# Patient Record
Sex: MALE | Race: OTHER | HISPANIC OR LATINO | URBAN - METROPOLITAN AREA
[De-identification: names, ages, dates, MRNs, and addresses within clinical notes are randomized per-mention and may not be internally consistent; named-entity substitution may affect disease eponyms.]

---

## 2020-03-01 RX ADMIN — SODIUM CHLORIDE 1000 MILLILITER(S): 9 INJECTION INTRAMUSCULAR; INTRAVENOUS; SUBCUTANEOUS at 00:23

## 2020-03-31 ENCOUNTER — INPATIENT (INPATIENT)
Facility: HOSPITAL | Age: 45
LOS: 6 days | Discharge: HOME | DRG: 177 | End: 2020-04-07
Attending: FAMILY MEDICINE | Admitting: HOSPITALIST
Payer: MEDICAID

## 2020-03-31 VITALS
OXYGEN SATURATION: 94 % | RESPIRATION RATE: 24 BRPM | HEART RATE: 97 BPM | WEIGHT: 175.05 LBS | HEIGHT: 68 IN | TEMPERATURE: 100 F | SYSTOLIC BLOOD PRESSURE: 120 MMHG | DIASTOLIC BLOOD PRESSURE: 76 MMHG

## 2020-03-31 LAB
ALBUMIN SERPL ELPH-MCNC: 3.6 G/DL — SIGNIFICANT CHANGE UP (ref 3.3–5)
ALP SERPL-CCNC: 85 U/L — SIGNIFICANT CHANGE UP (ref 40–120)
ALT FLD-CCNC: 75 U/L — SIGNIFICANT CHANGE UP (ref 12–78)
ANION GAP SERPL CALC-SCNC: 6 MMOL/L — SIGNIFICANT CHANGE UP (ref 5–17)
AST SERPL-CCNC: 73 U/L — HIGH (ref 15–37)
BILIRUB SERPL-MCNC: 0.4 MG/DL — SIGNIFICANT CHANGE UP (ref 0.2–1.2)
BUN SERPL-MCNC: 8 MG/DL — SIGNIFICANT CHANGE UP (ref 7–23)
CALCIUM SERPL-MCNC: 8.2 MG/DL — LOW (ref 8.5–10.1)
CHLORIDE SERPL-SCNC: 107 MMOL/L — SIGNIFICANT CHANGE UP (ref 96–108)
CO2 SERPL-SCNC: 25 MMOL/L — SIGNIFICANT CHANGE UP (ref 22–31)
CREAT SERPL-MCNC: 1 MG/DL — SIGNIFICANT CHANGE UP (ref 0.5–1.3)
GLUCOSE SERPL-MCNC: 120 MG/DL — HIGH (ref 70–99)
LIDOCAIN IGE QN: 122 U/L — SIGNIFICANT CHANGE UP (ref 73–393)
POTASSIUM SERPL-MCNC: 3.8 MMOL/L — SIGNIFICANT CHANGE UP (ref 3.5–5.3)
POTASSIUM SERPL-SCNC: 3.8 MMOL/L — SIGNIFICANT CHANGE UP (ref 3.5–5.3)
PROT SERPL-MCNC: 8.5 G/DL — HIGH (ref 6–8.3)
SODIUM SERPL-SCNC: 138 MMOL/L — SIGNIFICANT CHANGE UP (ref 135–145)

## 2020-03-31 RX ORDER — ACETAMINOPHEN 500 MG
650 TABLET ORAL ONCE
Refills: 0 | Status: COMPLETED | OUTPATIENT
Start: 2020-03-31 | End: 2020-03-31

## 2020-03-31 RX ORDER — SODIUM CHLORIDE 9 MG/ML
3 INJECTION INTRAMUSCULAR; INTRAVENOUS; SUBCUTANEOUS ONCE
Refills: 0 | Status: COMPLETED | OUTPATIENT
Start: 2020-03-31 | End: 2020-03-31

## 2020-03-31 RX ORDER — SODIUM CHLORIDE 9 MG/ML
1000 INJECTION INTRAMUSCULAR; INTRAVENOUS; SUBCUTANEOUS ONCE
Refills: 0 | Status: COMPLETED | OUTPATIENT
Start: 2020-03-31 | End: 2020-03-31

## 2020-03-31 RX ADMIN — SODIUM CHLORIDE 1000 MILLILITER(S): 9 INJECTION INTRAMUSCULAR; INTRAVENOUS; SUBCUTANEOUS at 23:23

## 2020-03-31 RX ADMIN — SODIUM CHLORIDE 3 MILLILITER(S): 9 INJECTION INTRAMUSCULAR; INTRAVENOUS; SUBCUTANEOUS at 23:23

## 2020-03-31 RX ADMIN — Medication 650 MILLIGRAM(S): at 23:23

## 2020-03-31 NOTE — ED ADULT NURSE NOTE - OBJECTIVE STATEMENT
Pt presents to ED with c/o SOB with chest pain for 3 days. Pt states he felt like he had a fever since last Wednesday with cough. Pt denies having sputum. Abd soft, non-tender.

## 2020-03-31 NOTE — ED ADULT NURSE NOTE - ISOLATION INDICATION [employer] CONTACT
Called patient to schedule a screening mammogram PATIENTPHONEMESSAGE: left message.-     Additional information     Other Specify

## 2020-04-01 DIAGNOSIS — R09.02 HYPOXEMIA: ICD-10-CM

## 2020-04-01 DIAGNOSIS — Z29.9 ENCOUNTER FOR PROPHYLACTIC MEASURES, UNSPECIFIED: ICD-10-CM

## 2020-04-01 LAB
BASOPHILS # BLD AUTO: 0.01 K/UL — SIGNIFICANT CHANGE UP (ref 0–0.2)
BASOPHILS NFR BLD AUTO: 0.2 % — SIGNIFICANT CHANGE UP (ref 0–2)
EOSINOPHIL # BLD AUTO: 0.01 K/UL — SIGNIFICANT CHANGE UP (ref 0–0.5)
EOSINOPHIL NFR BLD AUTO: 0.2 % — SIGNIFICANT CHANGE UP (ref 0–6)
HCT VFR BLD CALC: 43.8 % — SIGNIFICANT CHANGE UP (ref 39–50)
HGB BLD-MCNC: 15.1 G/DL — SIGNIFICANT CHANGE UP (ref 13–17)
IMM GRANULOCYTES NFR BLD AUTO: 0.3 % — SIGNIFICANT CHANGE UP (ref 0–1.5)
LYMPHOCYTES # BLD AUTO: 1.04 K/UL — SIGNIFICANT CHANGE UP (ref 1–3.3)
LYMPHOCYTES # BLD AUTO: 16.4 % — SIGNIFICANT CHANGE UP (ref 13–44)
MCHC RBC-ENTMCNC: 29.7 PG — SIGNIFICANT CHANGE UP (ref 27–34)
MCHC RBC-ENTMCNC: 34.5 GM/DL — SIGNIFICANT CHANGE UP (ref 32–36)
MCV RBC AUTO: 86.1 FL — SIGNIFICANT CHANGE UP (ref 80–100)
MONOCYTES # BLD AUTO: 0.39 K/UL — SIGNIFICANT CHANGE UP (ref 0–0.9)
MONOCYTES NFR BLD AUTO: 6.2 % — SIGNIFICANT CHANGE UP (ref 2–14)
NEUTROPHILS # BLD AUTO: 4.87 K/UL — SIGNIFICANT CHANGE UP (ref 1.8–7.4)
NEUTROPHILS NFR BLD AUTO: 76.7 % — SIGNIFICANT CHANGE UP (ref 43–77)
NRBC # BLD: 0 /100 WBCS — SIGNIFICANT CHANGE UP (ref 0–0)
PLATELET # BLD AUTO: 126 K/UL — LOW (ref 150–400)
RBC # BLD: 5.09 M/UL — SIGNIFICANT CHANGE UP (ref 4.2–5.8)
RBC # FLD: 12.8 % — SIGNIFICANT CHANGE UP (ref 10.3–14.5)
SARS-COV-2 RNA SPEC QL NAA+PROBE: DETECTED
WBC # BLD: 6.34 K/UL — SIGNIFICANT CHANGE UP (ref 3.8–10.5)
WBC # FLD AUTO: 6.34 K/UL — SIGNIFICANT CHANGE UP (ref 3.8–10.5)

## 2020-04-01 PROCEDURE — 93010 ELECTROCARDIOGRAM REPORT: CPT

## 2020-04-01 PROCEDURE — 99285 EMERGENCY DEPT VISIT HI MDM: CPT

## 2020-04-01 PROCEDURE — 99223 1ST HOSP IP/OBS HIGH 75: CPT | Mod: GC

## 2020-04-01 PROCEDURE — 12345: CPT | Mod: NC

## 2020-04-01 PROCEDURE — 71045 X-RAY EXAM CHEST 1 VIEW: CPT | Mod: 26

## 2020-04-01 RX ORDER — ALBUTEROL 90 UG/1
2 AEROSOL, METERED ORAL EVERY 4 HOURS
Refills: 0 | Status: DISCONTINUED | OUTPATIENT
Start: 2020-04-01 | End: 2020-04-07

## 2020-04-01 RX ORDER — ACETAMINOPHEN 500 MG
650 TABLET ORAL EVERY 4 HOURS
Refills: 0 | Status: DISCONTINUED | OUTPATIENT
Start: 2020-04-01 | End: 2020-04-07

## 2020-04-01 RX ORDER — AZITHROMYCIN 500 MG/1
500 TABLET, FILM COATED ORAL ONCE
Refills: 0 | Status: COMPLETED | OUTPATIENT
Start: 2020-04-01 | End: 2020-04-01

## 2020-04-01 RX ORDER — ACETAMINOPHEN 500 MG
325 TABLET ORAL ONCE
Refills: 0 | Status: COMPLETED | OUTPATIENT
Start: 2020-04-01 | End: 2020-04-01

## 2020-04-01 RX ORDER — ENOXAPARIN SODIUM 100 MG/ML
40 INJECTION SUBCUTANEOUS DAILY
Refills: 0 | Status: DISCONTINUED | OUTPATIENT
Start: 2020-04-01 | End: 2020-04-07

## 2020-04-01 RX ADMIN — Medication 650 MILLIGRAM(S): at 12:24

## 2020-04-01 RX ADMIN — AZITHROMYCIN 500 MILLIGRAM(S): 500 TABLET, FILM COATED ORAL at 03:07

## 2020-04-01 RX ADMIN — Medication 650 MILLIGRAM(S): at 22:17

## 2020-04-01 RX ADMIN — AZITHROMYCIN 255 MILLIGRAM(S): 500 TABLET, FILM COATED ORAL at 02:01

## 2020-04-01 RX ADMIN — Medication 325 MILLIGRAM(S): at 00:43

## 2020-04-01 RX ADMIN — ENOXAPARIN SODIUM 40 MILLIGRAM(S): 100 INJECTION SUBCUTANEOUS at 18:42

## 2020-04-01 NOTE — ED PROVIDER NOTE - OBJECTIVE STATEMENT
Pt is a 45 yo male who presents to the ED with a cc of difficulty breathing and chest discomfort.  Pt reports that he has no known medical issues.  Reports that he has been feeling unwell since last Wednesday.  Pt reports that last Wednesday he developed subjective fevers, chills, fatigue, diffuse myalgias, and a non productive cough.  He reports that symptoms continued and appears to have worsened and then over the last 2-3 days he developed chest tightness most noted on inspiration with shortness or breath.  Denies N/V/D/C, abd pain, ext numbness or weakness.  Pt reports that he is not a smoker and has no underlying lung pathology.  Denies noting any sick contacts.  He has not been seen for these symptoms prior to today.

## 2020-04-01 NOTE — H&P ADULT - NSHPPHYSICALEXAM_GEN_ALL_CORE
Vital Signs Last 24 Hrs  T(C): 37.1 (01 Apr 2020 05:53), Max: 39.5 (31 Mar 2020 22:52)  T(F): 98.8 (01 Apr 2020 05:53), Max: 103.1 (31 Mar 2020 22:52)  HR: 88 (01 Apr 2020 05:53) (88 - 100)  BP: 123/79 (01 Apr 2020 05:53) (113/77 - 123/79)  BP(mean): --  RR: 20 (01 Apr 2020 05:53) (20 - 24)  SpO2: 97% (01 Apr 2020 05:53) (94% - 97%)    patient resting comfortable on ra , breathing regular non labored   please refer to ED provider note further exam details

## 2020-04-01 NOTE — PROGRESS NOTE ADULT - SUBJECTIVE AND OBJECTIVE BOX
*************CHARTING IN PROGRESS*****************  Patient is a 44y old  Male who presents with a chief complaint of r/o COVID, symptom onset 3/25/2020, FULL CODE (01 Apr 2020 02:18)      INTERVAL HPI/OVERNIGHT EVENTS: Patient seen and examined at bedside.    MEDICATIONS  (STANDING):  enoxaparin Injectable 40 milliGRAM(s) SubCutaneous daily    MEDICATIONS  (PRN):  acetaminophen   Tablet .. 650 milliGRAM(s) Oral every 4 hours PRN Temp greater or equal to 38.5C (101.3F)  ALBUTerol    90 MICROgram(s) HFA Inhaler 2 Puff(s) Inhalation every 4 hours PRN Shortness of Breath and/or Wheezing  benzonatate 100 milliGRAM(s) Oral three times a day PRN Cough      Allergies    No Known Allergies    Intolerances        REVIEW OF SYSTEMS:  CONSTITUTIONAL: No fever or chills  HEENT: No headache, no sore throat  RESPIRATORY: No cough, wheezing, or shortness of breath  CARDIOVASCULAR: No chest pain, palpitations, or leg swelling  GASTROINTESTINAL: No abd pain, nausea, vomiting, or diarrhea  GENITOURINARY: No dysuria, frequency, or hematuria  NEUROLOGICAL: No focal weakness or dizziness  MUSCULOSKELETAL: No myalgias     Vital Signs Last 24 Hrs  T(C): 37.2 (01 Apr 2020 08:03), Max: 39.5 (31 Mar 2020 22:52)  T(F): 98.9 (01 Apr 2020 08:03), Max: 103.1 (31 Mar 2020 22:52)  HR: 88 (01 Apr 2020 08:03) (88 - 100)  BP: 114/78 (01 Apr 2020 08:03) (113/77 - 123/79)  BP(mean): --  RR: 20 (01 Apr 2020 05:53) (20 - 24)  SpO2: 98% (01 Apr 2020 08:03) (94% - 98%)    PHYSICAL EXAM:  GENERAL: NAD  HEENT: EOMI, moist mucous membranes  CHEST/LUNG: CTA b/l, no rales, wheezes, or rhonchi  HEART: RRR, S1, S2  ABDOMEN: BS+, soft, nontender, nondistended  EXTREMITIES: No edema, cyanosis, or calf tenderness  NERVOUS SYSTEM: AA&Ox3    LABS:                        15.1   6.34  )-----------( 126      ( 31 Mar 2020 23:22 )             43.8     CBC Full  -  ( 31 Mar 2020 23:22 )  WBC Count : 6.34 K/uL  Hemoglobin : 15.1 g/dL  Hematocrit : 43.8 %  Platelet Count - Automated : 126 K/uL  Mean Cell Volume : 86.1 fl  Mean Cell Hemoglobin : 29.7 pg  Mean Cell Hemoglobin Concentration : 34.5 gm/dL  Auto Neutrophil # : 4.87 K/uL  Auto Lymphocyte # : 1.04 K/uL  Auto Monocyte # : 0.39 K/uL  Auto Eosinophil # : 0.01 K/uL  Auto Basophil # : 0.01 K/uL  Auto Neutrophil % : 76.7 %  Auto Lymphocyte % : 16.4 %  Auto Monocyte % : 6.2 %  Auto Eosinophil % : 0.2 %  Auto Basophil % : 0.2 %    31 Mar 2020 23:22    138    |  107    |  8      ----------------------------<  120    3.8     |  25     |  1.00     Ca    8.2        31 Mar 2020 23:22    TPro  8.5    /  Alb  3.6    /  TBili  0.4    /  DBili  x      /  AST  73     /  ALT  75     /  AlkPhos  85     31 Mar 2020 23:22        CAPILLARY BLOOD GLUCOSE              RADIOLOGY & ADDITIONAL TESTS:    Personally reviewed.     Consultant(s) Notes Reviewed:  [x] YES  [ ] NO Patient is a 44y old  Male who presents with a chief complaint of r/o COVID, symptom onset 3/25/2020, FULL CODE (01 Apr 2020 02:18)      INTERVAL HPI/OVERNIGHT EVENTS: Patient seen and examined at bedside. States he feels well, denies any CP, abd pain. Still with cough, breathing improved.    MEDICATIONS  (STANDING):  enoxaparin Injectable 40 milliGRAM(s) SubCutaneous daily    MEDICATIONS  (PRN):  acetaminophen   Tablet .. 650 milliGRAM(s) Oral every 4 hours PRN Temp greater or equal to 38.5C (101.3F)  ALBUTerol    90 MICROgram(s) HFA Inhaler 2 Puff(s) Inhalation every 4 hours PRN Shortness of Breath and/or Wheezing  benzonatate 100 milliGRAM(s) Oral three times a day PRN Cough      Allergies    No Known Allergies    Intolerances        REVIEW OF SYSTEMS:  CONSTITUTIONAL: No fever or chills  HEENT: No headache, no sore throat  RESPIRATORY: + cough, no wheezing, +SOB  CARDIOVASCULAR: No chest pain, palpitations, or leg swelling  GASTROINTESTINAL: No abd pain, nausea, vomiting, or diarrhea  GENITOURINARY: No dysuria, frequency, or hematuria  NEUROLOGICAL: No focal weakness or dizziness  MUSCULOSKELETAL: No myalgias     Vital Signs Last 24 Hrs  T(C): 37.2 (01 Apr 2020 08:03), Max: 39.5 (31 Mar 2020 22:52)  T(F): 98.9 (01 Apr 2020 08:03), Max: 103.1 (31 Mar 2020 22:52)  HR: 88 (01 Apr 2020 08:03) (88 - 100)  BP: 114/78 (01 Apr 2020 08:03) (113/77 - 123/79)  BP(mean): --  RR: 20 (01 Apr 2020 05:53) (20 - 24)  SpO2: 98% (01 Apr 2020 08:03) (94% - 98%)    PHYSICAL EXAM:  GENERAL: NAD  HEENT: EOMI, moist mucous membranes  CHEST/LUNG: CTA b/l, no rales, wheezes, or rhonchi  HEART: RRR, S1, S2  ABDOMEN: BS+, soft, nontender, nondistended  EXTREMITIES: No edema, cyanosis, or calf tenderness  NERVOUS SYSTEM: AA&Ox3    LABS:                        15.1   6.34  )-----------( 126      ( 31 Mar 2020 23:22 )             43.8     CBC Full  -  ( 31 Mar 2020 23:22 )  WBC Count : 6.34 K/uL  Hemoglobin : 15.1 g/dL  Hematocrit : 43.8 %  Platelet Count - Automated : 126 K/uL  Mean Cell Volume : 86.1 fl  Mean Cell Hemoglobin : 29.7 pg  Mean Cell Hemoglobin Concentration : 34.5 gm/dL  Auto Neutrophil # : 4.87 K/uL  Auto Lymphocyte # : 1.04 K/uL  Auto Monocyte # : 0.39 K/uL  Auto Eosinophil # : 0.01 K/uL  Auto Basophil # : 0.01 K/uL  Auto Neutrophil % : 76.7 %  Auto Lymphocyte % : 16.4 %  Auto Monocyte % : 6.2 %  Auto Eosinophil % : 0.2 %  Auto Basophil % : 0.2 %    31 Mar 2020 23:22    138    |  107    |  8      ----------------------------<  120    3.8     |  25     |  1.00     Ca    8.2        31 Mar 2020 23:22    TPro  8.5    /  Alb  3.6    /  TBili  0.4    /  DBili  x      /  AST  73     /  ALT  75     /  AlkPhos  85     31 Mar 2020 23:22        CAPILLARY BLOOD GLUCOSE              RADIOLOGY & ADDITIONAL TESTS:    Personally reviewed.     Consultant(s) Notes Reviewed:  [x] YES  [ ] NO

## 2020-04-01 NOTE — ED PROVIDER NOTE - CARE PLAN
Principal Discharge DX:	Hypoxia  Secondary Diagnosis:	Infiltrate noted on imaging study  Secondary Diagnosis:	Fever

## 2020-04-01 NOTE — PROGRESS NOTE ADULT - ASSESSMENT
43 yo male who presents to the ED with a cc of difficulty breathing and chest discomfort. Admitted for r/o COVID. none

## 2020-04-01 NOTE — PROGRESS NOTE ADULT - PROBLEM SELECTOR PLAN 1
Acute hypoxic respiratory failure 2/2 PNA in setting of suspected COVID19 viral illness given clinical presentation, CXR with bilateral GGO  - Day 8 of symptoms  - continue O2 NC, may use NRB  -  AVOID HFNC/Bipap/Nebs  - Contact and droplet isolation precautions  - Check CRP, D-dimer, ferritin, procalc  - Tylenol prn for fever  - may use albuterol/ipratroprium inhaler  - Start supplemental oxygen therapy immediately if pt has respiratory distress target SpO2 > 94%  - NEWS2 score 4 (if >= 7 high risk, consider ICU consult)  - monitor respiratory status closely  - patient is at a low risk of decompensation at this time  - Code Status: FULL CODE  -daily CBC w diff to follow eos, lymphocytes and neutrophils and daily NLR calculation (NLR <3 low vs >5 high) -other labs that may be selectively used to risk stratify and predict clinical course,   Procalcitonin (<0.2 associated with more severe disease),  Ferritin (lower risk <450 vs >850) CRP (low risk <2 and higher risk >6) D-dimer (<1,000 vs >1,000) LDH, ESR, PT/PTT, CK, lactate, troponin, IL-6  -antibiotics only if there is concern for a bacterial process

## 2020-04-01 NOTE — H&P ADULT - HISTORY OF PRESENT ILLNESS
45 yo male who presents to the ED with a cc of difficulty breathing and chest discomfort.  Pt reports that he has no known medical issues.  Reports that he has been feeling unwell since last Wednesday.  Pt reports that last Wednesday he developed subjective fevers, chills, fatigue, diffuse myalgias, and a non productive cough.  He reports that symptoms continued and appears to have worsened and then over the last 2-3 days he developed chest tightness most noted on inspiration with shortness or breath.  Denies N/V/D/C, abd pain, ext numbness or weakness.  Pt reports that he is not a smoker and has no underlying lung pathology.  Denies noting any sick contacts.  He has not been seen for these symptoms prior to today.    The date the pt first felt unwell:   Fever or chills: yes [  ]   no [  ]   - Tmax:   Fatigue, malaise or generalized weakness: yes [  ]   no [  ]  Shortness of breath/dyspnea: yes [  ]   no [  ]  Cough: yes [  ]   no [  ], sputum production: yes [  ]   no [  ]  Anorexia/po intolerance: yes [  ]   no [  ]  Chest pain or chest tightness: yes [  ]   no [  ]  Myalgias: yes [  ]   no [  ]  Headache: yes [  ]   no [  ]  Sore throat: yes [  ]   no [  ]  Rhinorrhea: yes [  ]   no [  ]  Abd pain: yes [  ]   no [  ]  Nausea: yes [  ]   no [  ]  Vomiting: yes [  ]   no [  ]  Diarrhea: yes [  ]   no [  ]  Loss of sense of smell/anosmia: yes [  ]   no [  ]  Conjunctivitis: yes [  ]   no [  ]  Recent travel: yes [  ]   no [  ] - Location:   Any sick contacts: yes [  ]   no [  ]  Close contact with someone confirmed positive with COVID-19 / SARS-CoV2 in the last 14 days: yes [  ]   no [  ]  Other associated complaints:   Code status: 45 yo male who presents to the ED with a cc of difficulty breathing and chest discomfort.  Pt reports that he has no known medical issues.  Reports that he has been feeling unwell since last Wednesday.  Pt reports that last Wednesday he developed subjective fevers, chills, fatigue, diffuse myalgias, and a non productive cough.  He reports that symptoms continued and appears to have worsened and then over the last 2-3 days he developed chest tightness most noted on inspiration with shortness or breath.  Denies N/V/D/C, abd pain, ext numbness or weakness.  Pt reports that he is not a smoker and has no underlying lung pathology.  Denies noting any sick contacts.  He has not been seen for these symptoms prior to today.    The date the pt first felt unwell:   Fever or chills: yes [x  ]   no [  ]   - Tmax:   Fatigue, malaise or generalized weakness: yes [ x ]   no [  ]  Shortness of breath/dyspnea: yes [ x ]   no [  ]  Cough: yes [ x ]   no [  ], sputum production: yes [  ]   no [x  ]  Anorexia/po intolerance: yes [  ]   no [ x ]  Chest pain or chest tightness: yes [ x ]   no [  ]  Myalgias: yes [  ]   no [ x ]  Headache: yes [  ]   no [ x ]  Sore throat: yes [  ]   no [x  ]  Rhinorrhea: yes [  ]   no [ x ]  Abd pain: yes [  ]   no [ x ]  Nausea: yes [  ]   no [ x ]  Vomiting: yes [  ]   no [ x ]  Diarrhea: yes [  ]   no [x  ]  Loss of sense of smell/anosmia: yes [  ]   no [ x ]  Conjunctivitis: yes [  ]   no [ x ]  Recent travel: yes [  ]   no [ x ] - Location:   Any sick contacts: yes [  ]   no [ x ]  Close contact with someone confirmed positive with COVID-19 / SARS-CoV2 in the last 14 days: yes [  ]   no [x  ]  Other associated complaints:   Code status: FULL CODE

## 2020-04-01 NOTE — H&P ADULT - NSHPREVIEWOFSYSTEMS_GEN_ALL_CORE
HEMATOLOGIC: denies blood in sputum, urine, stool  LYMPHATICS: denies extremity swelling, denies enlarged or tender lymph nodes  SKIN: denies new lesions, rash  CARDIOVASCULAR: as per HPI  CONSTITUTIONAL: as per HPI  HEENT: as per HPI  RESPIRATORY: as per HPI  GASTROINTESTINAL: as per HPI  NEUROLOGICAL: as per HPI  MUSCULOSKELETAL: as per HPI

## 2020-04-01 NOTE — ED PROVIDER NOTE - CLINICAL SUMMARY MEDICAL DECISION MAKING FREE TEXT BOX
Pt is a 43 yo male who presents to the ED with a cc of difficulty breathing and chest discomfort.  Pt reports that he has no known medical issues.  Reports that he has been feeling unwell since last Wednesday.  Pt reports that last Wednesday he developed subjective fevers, chills, fatigue, diffuse myalgias, and a non productive cough.  He reports that symptoms continued and appears to have worsened and then over the last 2-3 days he developed chest tightness most noted on inspiration with shortness or breath.  Denies N/V/D/C, abd pain, ext numbness or weakness.  Pt reports that he is not a smoker and has no underlying lung pathology.  Denies noting any sick contacts.  He has not been seen for these symptoms prior to today.  Pt with symptoms concerning for COVID now with worsening respiratory status and high spiking fevers.  Will obtain screening labs, swab for COVID and obtain chest x-ray.  Will hydrate, control fever and monitor

## 2020-04-01 NOTE — ED ADULT NURSE REASSESSMENT NOTE - NS ED NURSE REASSESS COMMENT FT1
Pt temp reassessed. 100.5. Pt ambulated on RA dropped to 90%. Pt placed on 2 L nasal cannula O2 sat 95% now. Pt denies SOB, chest pain when ambulating.
Patient is alert and calm.  He is sitting on stretcher in room 17.  Respirations are even and unlabored, skin is warm and dry.  He denies needs.  Call bell is within reach.
Pt fever is 102.2. MD Amos aware tylenol ordered and given. Ice packs placed behind head and armpits. will reassess.

## 2020-04-01 NOTE — H&P ADULT - PROBLEM SELECTOR PLAN 2
lovenox 40 daily   IMPROVE VTE Individual Risk Assessment          RISK                                                          Points    [  ] Previous VTE                                                3  [  ] Thrombophilia                                             2  [  ] Lower limb paralysis                                   2        (unable to hold up >15 seconds)    [  ] Current Cancer                                            2         (within 6 months)  [  ] Immobilization > 24 hrs                              1  [  ] ICU/CCU stay > 24 hours                            1  [  ] Age > 60                                                    1    IMPROVE VTE Score ____0_____

## 2020-04-01 NOTE — H&P ADULT - PROBLEM SELECTOR PLAN 1
Acute hypoxic respiratory failure 2/2 PNA in setting of suspected COVID19 viral illness given clinical presentation, CXR with bilateral GGO  - Day 8 of symptoms  - continue O2 NC, may use NRB  -  AVOID HFNC/Bipap/Nebs  - Contact and droplet isolation precautions  - Check CRP, D-dimer, ferritin, procalc  - Tylenol prn for fever  - may use albuterol/ipratroprium inhaler  - Start supplemental oxygen therapy immediately if pt has respiratory distress target SpO2 > 94%  - NEWS2 score 4 (if >= 7 high risk, consider ICU consult)  - monitor respiratory status closely  - patient is at a low risk of decompensation at this time  - Code Status:   -daily CBC w diff to follow eos, lymphocytes and neutrophils and daily NLR calculation (NLR <3 low vs >5 high) -other labs that may be selectively used to risk stratify and predict clinical course,   Procalcitonin (<0.2 associated with more severe disease),  Ferritin (lower risk <450 vs >850) CRP (low risk <2 and higher risk >6) D-dimer (<1,000 vs >1,000) LDH, ESR, PT/PTT, CK, lactate, troponin, IL-6  -antibiotics only if there is concern for a bacterial process Acute hypoxic respiratory failure 2/2 PNA in setting of suspected COVID19 viral illness given clinical presentation, CXR with bilateral GGO  - Day 8 of symptoms  - continue O2 NC, may use NRB  -  AVOID HFNC/Bipap/Nebs  - Contact and droplet isolation precautions  - Check CRP, D-dimer, ferritin, procalc  - Tylenol prn for fever  - may use albuterol/ipratroprium inhaler  - Start supplemental oxygen therapy immediately if pt has respiratory distress target SpO2 > 94%  - NEWS2 score 4 (if >= 7 high risk, consider ICU consult)  - monitor respiratory status closely  - patient is at a low risk of decompensation at this time  - Code Status: FULL CODE  -daily CBC w diff to follow eos, lymphocytes and neutrophils and daily NLR calculation (NLR <3 low vs >5 high) -other labs that may be selectively used to risk stratify and predict clinical course,   Procalcitonin (<0.2 associated with more severe disease),  Ferritin (lower risk <450 vs >850) CRP (low risk <2 and higher risk >6) D-dimer (<1,000 vs >1,000) LDH, ESR, PT/PTT, CK, lactate, troponin, IL-6  -antibiotics only if there is concern for a bacterial process

## 2020-04-01 NOTE — H&P ADULT - ATTENDING COMMENTS
44M p/w fever cough, myalgias and SOB x 1 week , febrile t max 103.1 , hypoxic w/ ambulation , improves w/ NC , CXR w/ b/l opacities , ekg NSR w/ qtc 421, can continue supportive care and f/o covid pcr

## 2020-04-02 DIAGNOSIS — J12.81 PNEUMONIA DUE TO SARS-ASSOCIATED CORONAVIRUS: ICD-10-CM

## 2020-04-02 LAB
ALBUMIN SERPL ELPH-MCNC: 3.1 G/DL — LOW (ref 3.3–5)
ALP SERPL-CCNC: 94 U/L — SIGNIFICANT CHANGE UP (ref 40–120)
ALT FLD-CCNC: 61 U/L — SIGNIFICANT CHANGE UP (ref 12–78)
ANION GAP SERPL CALC-SCNC: 10 MMOL/L — SIGNIFICANT CHANGE UP (ref 5–17)
AST SERPL-CCNC: 68 U/L — HIGH (ref 15–37)
BASOPHILS # BLD AUTO: 0 K/UL — SIGNIFICANT CHANGE UP (ref 0–0.2)
BASOPHILS NFR BLD AUTO: 0 % — SIGNIFICANT CHANGE UP (ref 0–2)
BILIRUB SERPL-MCNC: 0.7 MG/DL — SIGNIFICANT CHANGE UP (ref 0.2–1.2)
BUN SERPL-MCNC: 10 MG/DL — SIGNIFICANT CHANGE UP (ref 7–23)
CALCIUM SERPL-MCNC: 8.7 MG/DL — SIGNIFICANT CHANGE UP (ref 8.5–10.1)
CHLORIDE SERPL-SCNC: 104 MMOL/L — SIGNIFICANT CHANGE UP (ref 96–108)
CO2 SERPL-SCNC: 23 MMOL/L — SIGNIFICANT CHANGE UP (ref 22–31)
CREAT SERPL-MCNC: 0.99 MG/DL — SIGNIFICANT CHANGE UP (ref 0.5–1.3)
CRP SERPL-MCNC: 11.5 MG/DL — HIGH (ref 0–0.4)
D DIMER BLD IA.RAPID-MCNC: 229 NG/ML DDU — SIGNIFICANT CHANGE UP
EOSINOPHIL # BLD AUTO: 0 K/UL — SIGNIFICANT CHANGE UP (ref 0–0.5)
EOSINOPHIL NFR BLD AUTO: 0 % — SIGNIFICANT CHANGE UP (ref 0–6)
ERYTHROCYTE [SEDIMENTATION RATE] IN BLOOD: 53 MM/HR — HIGH (ref 0–15)
FERRITIN SERPL-MCNC: 1244 NG/ML — HIGH (ref 30–400)
GLUCOSE SERPL-MCNC: 108 MG/DL — HIGH (ref 70–99)
HCT VFR BLD CALC: 43.1 % — SIGNIFICANT CHANGE UP (ref 39–50)
HGB BLD-MCNC: 14.7 G/DL — SIGNIFICANT CHANGE UP (ref 13–17)
LDH SERPL L TO P-CCNC: 477 U/L — HIGH (ref 50–242)
LYMPHOCYTES # BLD AUTO: 2.09 K/UL — SIGNIFICANT CHANGE UP (ref 1–3.3)
LYMPHOCYTES # BLD AUTO: 22 % — SIGNIFICANT CHANGE UP (ref 13–44)
MCHC RBC-ENTMCNC: 29.4 PG — SIGNIFICANT CHANGE UP (ref 27–34)
MCHC RBC-ENTMCNC: 34.1 GM/DL — SIGNIFICANT CHANGE UP (ref 32–36)
MCV RBC AUTO: 86.2 FL — SIGNIFICANT CHANGE UP (ref 80–100)
MONOCYTES # BLD AUTO: 0.19 K/UL — SIGNIFICANT CHANGE UP (ref 0–0.9)
MONOCYTES NFR BLD AUTO: 2 % — SIGNIFICANT CHANGE UP (ref 2–14)
NEUTROPHILS # BLD AUTO: 7.04 K/UL — SIGNIFICANT CHANGE UP (ref 1.8–7.4)
NEUTROPHILS NFR BLD AUTO: 56 % — SIGNIFICANT CHANGE UP (ref 43–77)
NRBC # BLD: SIGNIFICANT CHANGE UP /100 WBCS (ref 0–0)
PLATELET # BLD AUTO: 179 K/UL — SIGNIFICANT CHANGE UP (ref 150–400)
POTASSIUM SERPL-MCNC: 4.1 MMOL/L — SIGNIFICANT CHANGE UP (ref 3.5–5.3)
POTASSIUM SERPL-SCNC: 4.1 MMOL/L — SIGNIFICANT CHANGE UP (ref 3.5–5.3)
PROCALCITONIN SERPL-MCNC: 0.41 NG/ML — HIGH (ref 0–0.04)
PROT SERPL-MCNC: 8.1 G/DL — SIGNIFICANT CHANGE UP (ref 6–8.3)
RBC # BLD: 5 M/UL — SIGNIFICANT CHANGE UP (ref 4.2–5.8)
RBC # FLD: 13 % — SIGNIFICANT CHANGE UP (ref 10.3–14.5)
SODIUM SERPL-SCNC: 137 MMOL/L — SIGNIFICANT CHANGE UP (ref 135–145)
WBC # BLD: 9.51 K/UL — SIGNIFICANT CHANGE UP (ref 3.8–10.5)
WBC # FLD AUTO: 9.51 K/UL — SIGNIFICANT CHANGE UP (ref 3.8–10.5)

## 2020-04-02 PROCEDURE — 99233 SBSQ HOSP IP/OBS HIGH 50: CPT

## 2020-04-02 RX ADMIN — ENOXAPARIN SODIUM 40 MILLIGRAM(S): 100 INJECTION SUBCUTANEOUS at 18:06

## 2020-04-02 RX ADMIN — Medication 650 MILLIGRAM(S): at 18:13

## 2020-04-02 RX ADMIN — Medication 650 MILLIGRAM(S): at 10:28

## 2020-04-02 RX ADMIN — ALBUTEROL 2 PUFF(S): 90 AEROSOL, METERED ORAL at 18:12

## 2020-04-02 NOTE — CONSULT NOTE ADULT - ASSESSMENT
43 yo maleadm with a cc of difficulty breathing and chest discomfort and is COVID+  COVID-19---high risk period for decompensation  (7-14 days post symptom onset), so critical to determine date of symptom onset,  avoid aerosolizing procedures,  focus on supportive care, avoid excessive blood draws so limit lab testing and draws - do recommend for hospitalized patients  -For all patients: daily BMP and CBC w diff to follow eos, lymphocytes and neutrophils and daily   NLR baseline and dynamic calculation (NLR <3 low vs >5 high) -other labs at admission to risk stratify and predict clinical course,  Procalcitonin (>0.2 associated with more severe disease),  Ferritin (lower risk <450 vs >850),  CRP (low risk <2 and higher risk >6) , D-dimer (<1,000 vs >1,000)   and if prognosis is unclear or if immunomodulators being considered: LDH, ESR, PT/PTT, CK, total, CKMB mass assay, lactate, troponin, IL-6 (and other interleukins as indicated)  -antibiotics only if there is concern for a bacterial process, HCQ 400mg PO BID day 1 followed by 200mg PO BID x 4 days may play a role if started early in disease, noted that methylprednisolone (SOLU-medrol) 1mg/kg/day IV x 5 days at onset of increased oxygen requirement (potential to finish w PO) and IL6 inhibition during this critical window may be associated with avoiding intubation and improved outcomes    3/25 symptom onset  4/2 NLR 7.04/2.09 =3.4 an on NC, follow for any increasing oxygen need

## 2020-04-02 NOTE — PROGRESS NOTE ADULT - PROBLEM SELECTOR PLAN 1
Acute hypoxic respiratory failure 2/2 PNA in setting of suspected COVID19 viral illness given clinical presentation, CXR with bilateral GGO  - Day 9 of symptoms  - continue O2 NC, may use NRB  -  AVOID HFNC/Bipap/Nebs  - Contact and droplet isolation precautions  - Check CRP, D-dimer, ferritin, procalc  - Tylenol prn for fever  - may use albuterol/ipratroprium inhaler  - Start supplemental oxygen therapy immediately if pt has respiratory distress target SpO2 > 94%  - NEWS2 score 4 (if >= 7 high risk, consider ICU consult)  - monitor respiratory status closely  - patient is at a low risk of decompensation at this time  - Code Status: FULL CODE  -daily CBC w diff to follow eos, lymphocytes and neutrophils and daily NLR calculation (NLR <3 low vs >5 high) -other labs that may be selectively used to risk stratify and predict clinical course,   Procalcitonin (<0.2 associated with more severe disease),  Ferritin (lower risk <450 vs >850) CRP (low risk <2 and higher risk >6) D-dimer (<1,000 vs >1,000) LDH, ESR, PT/PTT, CK, lactate, troponin, IL-6  -antibiotics only if there is concern for a bacterial process  -COVID positive

## 2020-04-02 NOTE — PROGRESS NOTE ADULT - ASSESSMENT
43 yo male who presents to the ED with a cc of difficulty breathing and chest discomfort. Admitted for r/o COVID.

## 2020-04-02 NOTE — CONSULT NOTE ADULT - SUBJECTIVE AND OBJECTIVE BOX
HPI:  45 yo male who presents to the ED 4/1 with a cc of difficulty breathing and chest discomfort.  Pt reports that he has no known medical issues.  Reports that he has been feeling unwell since last Wednesday.  Pt reports that last Wednesday he developed subjective fevers, chills, fatigue, diffuse myalgias, and a non productive cough.  He reports that symptoms continued and appears to have worsened and then over the last 2-3 days he developed chest tightness most noted on inspiration with shortness or breath.  Denies N/V/D/C, abd pain, ext numbness or weakness.  Pt reports that he is not a smoker and has no underlying lung pathology.  Denies noting any sick contacts.  He has not been seen for these symptoms prior to today.    3/25 symptom onset    The date the pt first felt unwell:   Fever or chills: yes [x  ]   no [  ]   - Tmax:   Fatigue, malaise or generalized weakness: yes [ x ]   no [  ]  Shortness of breath/dyspnea: yes [ x ]   no [  ]  Cough: yes [ x ]   no [  ], sputum production: yes [  ]   no [x  ]  Anorexia/po intolerance: yes [  ]   no [ x ]  Chest pain or chest tightness: yes [ x ]   no [  ]  Myalgias: yes [  ]   no [ x ]  Headache: yes [  ]   no [ x ]  Sore throat: yes [  ]   no [x  ]  Rhinorrhea: yes [  ]   no [ x ]  Abd pain: yes [  ]   no [ x ]  Nausea: yes [  ]   no [ x ]  Vomiting: yes [  ]   no [ x ]  Diarrhea: yes [  ]   no [x  ]  Loss of sense of smell/anosmia: yes [  ]   no [ x ]  Conjunctivitis: yes [  ]   no [ x ]  Recent travel: yes [  ]   no [ x ] - Location:   Any sick contacts: yes [  ]   no [ x ]  Close contact with someone confirmed positive with COVID-19 / SARS-CoV2 in the last 14 days: yes [  ]   no [x  ]  Other associated complaints:   Code status: FULL CODE (01 Apr 2020 02:18)      PAST MEDICAL & SURGICAL HISTORY:  No pertinent past medical history  No significant past surgical history      Antimicrobials      Immunological      Other  acetaminophen   Tablet .. 650 milliGRAM(s) Oral every 4 hours PRN  ALBUTerol    90 MICROgram(s) HFA Inhaler 2 Puff(s) Inhalation every 4 hours PRN  benzonatate 100 milliGRAM(s) Oral three times a day PRN  enoxaparin Injectable 40 milliGRAM(s) SubCutaneous daily      Allergies    No Known Allergies    Intolerances        SOCIAL HISTORY:  Social History:  live with son  independent adls  non smoker  denies etoh   denies drug use  FULL CODE (01 Apr 2020 02:18)      FAMILY HISTORY:      ROS:    limited    Vital Signs Last 24 Hrs  T(C): 37.7 (02 Apr 2020 14:44), Max: 39.4 (01 Apr 2020 20:40)  T(F): 99.8 (02 Apr 2020 14:44), Max: 103 (01 Apr 2020 20:40)  HR: 77 (02 Apr 2020 14:44) (77 - 139)  BP: 108/71 (02 Apr 2020 14:44) (98/69 - 110/63)  BP(mean): --  RR: 18 (02 Apr 2020 14:44) (18 - 19)  SpO2: 93% (02 Apr 2020 14:44) (90% - 96%)    PE:  on NC  HEENT:  NC, atraumatic  Lungs:  No accessory muscle use, breathing comfortably  Cor:  distant  Abd:  Symmetric, appears normal,   Extrem:  No cyanosis      LABS:                        14.7   9.51  )-----------( 179      ( 02 Apr 2020 10:00 )             43.1       WBC Count: 9.51 K/uL (04-02-20 @ 10:00)  WBC Count: 6.34 K/uL (03-31-20 @ 23:22)      04-02    137  |  104  |  10  ----------------------------<  108<H>  4.1   |  23  |  0.99    Ca    8.7      02 Apr 2020 10:00    TPro  8.1  /  Alb  3.1<L>  /  TBili  0.7  /  DBili  x   /  AST  68<H>  /  ALT  61  /  AlkPhos  94  04-02      Creatinine, Serum: 0.99 mg/dL (04-02-20 @ 10:00)  Creatinine, Serum: 1.00 mg/dL (03-31-20 @ 23:22)              COVID RISK SCORE:  Auto Neutrophil #: 7.04 K/uL (04-02-20 @ 10:00)  Auto Lymphocyte #: 2.09 K/uL (04-02-20 @ 10:00)  Auto Lymphocyte #: 1.04 K/uL (03-31-20 @ 23:22)  Auto Neutrophil #: 4.87 K/uL (03-31-20 @ 23:22)      Auto Eosinophil #: 0.00 K/uL (04-02-20 @ 10:00)  Auto Eosinophil #: 0.01 K/uL (03-31-20 @ 23:22)    Lactate Dehydrogenase, Serum: 477 U/L (04-02-20 @ 15:19)    Sedimentation Rate, Erythrocyte: 53 mm/hr (04-02-20 @ 10:00)    Procalcitonin, Serum: 0.41 ng/mL (04-02-20 @ 10:00)            Ferritin, Serum: 1244 ng/mL (04-02-20 @ 15:40)          D-Dimer Assay, Quantitative: 229 ng/mL DDU (04-02-20 @ 10:00)        MICROBIOLOGY:      RADIOLOGY & ADDITIONAL STUDIES:    --

## 2020-04-02 NOTE — PROGRESS NOTE ADULT - SUBJECTIVE AND OBJECTIVE BOX
INTERVAL HPI/OVERNIGHT EVENTS: Patient seen and examined at bedside. States he feels well, denies any CP, SOB, abd pain. Febrile overnight and this AM.    MEDICATIONS  (STANDING):  enoxaparin Injectable 40 milliGRAM(s) SubCutaneous daily    MEDICATIONS  (PRN):  acetaminophen   Tablet .. 650 milliGRAM(s) Oral every 4 hours PRN Temp greater or equal to 38.5C (101.3F)  ALBUTerol    90 MICROgram(s) HFA Inhaler 2 Puff(s) Inhalation every 4 hours PRN Shortness of Breath and/or Wheezing  benzonatate 100 milliGRAM(s) Oral three times a day PRN Cough      Allergies    No Known Allergies    Intolerances      REVIEW OF SYSTEMS:  CONSTITUTIONAL: No fever or chills  HEENT: No headache, no sore throat  RESPIRATORY: + cough, no wheezing, no SOB  CARDIOVASCULAR: No chest pain, palpitations, or leg swelling  GASTROINTESTINAL: No abd pain, nausea, vomiting, or diarrhea  GENITOURINARY: No dysuria, frequency, or hematuria  NEUROLOGICAL: No focal weakness or dizziness  MUSCULOSKELETAL: No myalgias     Vital Signs Last 24 Hrs  T(C): 37.7 (02 Apr 2020 14:44), Max: 38.2 (02 Apr 2020 04:25)  T(F): 99.8 (02 Apr 2020 14:44), Max: 100.7 (02 Apr 2020 04:25)  HR: 77 (02 Apr 2020 14:44) (77 - 139)  BP: 108/71 (02 Apr 2020 14:44) (98/69 - 108/71)  BP(mean): --  RR: 18 (02 Apr 2020 14:44) (18 - 18)  SpO2: 93% (02 Apr 2020 14:44) (90% - 96%)      PHYSICAL EXAM:  GENERAL: NAD  HEENT: EOMI, moist mucous membranes  CHEST/LUNG: CTA b/l, no rales, wheezes, or rhonchi  HEART: RRR, S1, S2  ABDOMEN: BS+, soft, nontender, nondistended  EXTREMITIES: No edema, cyanosis, or calf tenderness  NERVOUS SYSTEM: AA&Ox3      LABS:                        14.7   9.51  )-----------( 179      ( 02 Apr 2020 10:00 )             43.1     04-02    137  |  104  |  10  ----------------------------<  108<H>  4.1   |  23  |  0.99    Ca    8.7      02 Apr 2020 10:00    TPro  8.1  /  Alb  3.1<L>  /  TBili  0.7  /  DBili  x   /  AST  68<H>  /  ALT  61  /  AlkPhos  94  04-02          RADIOLOGY & ADDITIONAL TESTS:

## 2020-04-03 LAB
ALBUMIN SERPL ELPH-MCNC: 2.8 G/DL — LOW (ref 3.3–5)
ALP SERPL-CCNC: 93 U/L — SIGNIFICANT CHANGE UP (ref 40–120)
ALT FLD-CCNC: 66 U/L — SIGNIFICANT CHANGE UP (ref 12–78)
ANION GAP SERPL CALC-SCNC: 9 MMOL/L — SIGNIFICANT CHANGE UP (ref 5–17)
AST SERPL-CCNC: 75 U/L — HIGH (ref 15–37)
BASOPHILS # BLD AUTO: 0.01 K/UL — SIGNIFICANT CHANGE UP (ref 0–0.2)
BASOPHILS NFR BLD AUTO: 0.2 % — SIGNIFICANT CHANGE UP (ref 0–2)
BILIRUB SERPL-MCNC: 0.6 MG/DL — SIGNIFICANT CHANGE UP (ref 0.2–1.2)
BUN SERPL-MCNC: 12 MG/DL — SIGNIFICANT CHANGE UP (ref 7–23)
CALCIUM SERPL-MCNC: 8.6 MG/DL — SIGNIFICANT CHANGE UP (ref 8.5–10.1)
CHLORIDE SERPL-SCNC: 105 MMOL/L — SIGNIFICANT CHANGE UP (ref 96–108)
CO2 SERPL-SCNC: 24 MMOL/L — SIGNIFICANT CHANGE UP (ref 22–31)
CREAT SERPL-MCNC: 0.85 MG/DL — SIGNIFICANT CHANGE UP (ref 0.5–1.3)
EOSINOPHIL # BLD AUTO: 0 K/UL — SIGNIFICANT CHANGE UP (ref 0–0.5)
EOSINOPHIL NFR BLD AUTO: 0 % — SIGNIFICANT CHANGE UP (ref 0–6)
GLUCOSE SERPL-MCNC: 127 MG/DL — HIGH (ref 70–99)
HCT VFR BLD CALC: 41.7 % — SIGNIFICANT CHANGE UP (ref 39–50)
HGB BLD-MCNC: 14.1 G/DL — SIGNIFICANT CHANGE UP (ref 13–17)
IMM GRANULOCYTES NFR BLD AUTO: 0.3 % — SIGNIFICANT CHANGE UP (ref 0–1.5)
LYMPHOCYTES # BLD AUTO: 1.28 K/UL — SIGNIFICANT CHANGE UP (ref 1–3.3)
LYMPHOCYTES # BLD AUTO: 19.2 % — SIGNIFICANT CHANGE UP (ref 13–44)
MCHC RBC-ENTMCNC: 28.8 PG — SIGNIFICANT CHANGE UP (ref 27–34)
MCHC RBC-ENTMCNC: 33.8 GM/DL — SIGNIFICANT CHANGE UP (ref 32–36)
MCV RBC AUTO: 85.3 FL — SIGNIFICANT CHANGE UP (ref 80–100)
MONOCYTES # BLD AUTO: 0.43 K/UL — SIGNIFICANT CHANGE UP (ref 0–0.9)
MONOCYTES NFR BLD AUTO: 6.5 % — SIGNIFICANT CHANGE UP (ref 2–14)
NEUTROPHILS # BLD AUTO: 4.91 K/UL — SIGNIFICANT CHANGE UP (ref 1.8–7.4)
NEUTROPHILS NFR BLD AUTO: 73.8 % — SIGNIFICANT CHANGE UP (ref 43–77)
NRBC # BLD: 0 /100 WBCS — SIGNIFICANT CHANGE UP (ref 0–0)
PLATELET # BLD AUTO: 206 K/UL — SIGNIFICANT CHANGE UP (ref 150–400)
POTASSIUM SERPL-MCNC: 3.6 MMOL/L — SIGNIFICANT CHANGE UP (ref 3.5–5.3)
POTASSIUM SERPL-SCNC: 3.6 MMOL/L — SIGNIFICANT CHANGE UP (ref 3.5–5.3)
PROT SERPL-MCNC: 8.2 G/DL — SIGNIFICANT CHANGE UP (ref 6–8.3)
RBC # BLD: 4.89 M/UL — SIGNIFICANT CHANGE UP (ref 4.2–5.8)
RBC # FLD: 13.2 % — SIGNIFICANT CHANGE UP (ref 10.3–14.5)
SODIUM SERPL-SCNC: 138 MMOL/L — SIGNIFICANT CHANGE UP (ref 135–145)
WBC # BLD: 6.65 K/UL — SIGNIFICANT CHANGE UP (ref 3.8–10.5)
WBC # FLD AUTO: 6.65 K/UL — SIGNIFICANT CHANGE UP (ref 3.8–10.5)

## 2020-04-03 PROCEDURE — 99232 SBSQ HOSP IP/OBS MODERATE 35: CPT

## 2020-04-03 RX ADMIN — ENOXAPARIN SODIUM 40 MILLIGRAM(S): 100 INJECTION SUBCUTANEOUS at 11:55

## 2020-04-03 NOTE — PROGRESS NOTE ADULT - ASSESSMENT
45 yo maleadm with a cc of difficulty breathing and chest discomfort and is COVID+  COVID-19---high risk period for decompensation  (7-14 days post symptom onset), so critical to determine date of symptom onset,  avoid aerosolizing procedures,  focus on supportive care, avoid excessive blood draws so limit lab testing and draws - do recommend for hospitalized patients  -For all patients: daily BMP and CBC w diff to follow eos, lymphocytes and neutrophils and daily   NLR baseline and dynamic calculation (NLR <3 low vs >5 high) -other labs at admission to risk stratify and predict clinical course,  Procalcitonin (>0.2 associated with more severe disease),  Ferritin (lower risk <450 vs >850),  CRP (low risk <2 and higher risk >6) , D-dimer (<1,000 vs >1,000)   and if prognosis is unclear or if immunomodulators being considered: LDH, ESR, PT/PTT, CK, total, CKMB mass assay, lactate, troponin, IL-6 (and other interleukins as indicated)  -antibiotics only if there is concern for a bacterial process, HCQ 400mg PO BID day 1 followed by 200mg PO BID x 4 days may play a role if started early in disease, noted that methylprednisolone (SOLU-medrol) 1mg/kg/day IV x 5 days at onset of increased oxygen requirement (potential to finish w PO) and IL6 inhibition during this critical window may be associated with avoiding intubation and improved outcomes    3/25 symptom onset  4/2 NLR 7.04/2.09 =3.4 an on NC  4/3 NLR 3.83

## 2020-04-03 NOTE — PROGRESS NOTE ADULT - SUBJECTIVE AND OBJECTIVE BOX
[facility], Division of Infectious Diseases  AUNDREA Fox A. Lee  [phone]    Name: ILANA BAEZA  Age: 44y  Gender: Male  MRN: [id number]    Interval History--  Notes reviewed  pt states he is better      Past Medical History--  No pertinent past medical history  No significant past surgical history      For details regarding the patient's social history, family history, and other miscellaneous elements, please refer the initial infectious diseases consultation and/or the admitting history and physical examination for this admission.    Allergies    No Known Allergies    Intolerances        Medications--  Antibiotics:    Immunologic:    Other:  acetaminophen   Tablet .. PRN  ALBUTerol    90 MICROgram(s) HFA Inhaler PRN  benzonatate PRN  enoxaparin Injectable      Review of Systems--  A 10-point review of systems was obtained.     unchanged    Review of systems otherwise negative except as previously noted.    Physical Examination--  Vital Signs: T(F): 99.1 (04-03-20 @ 04:50), Max: 99.8 (04-02-20 @ 14:44)  HR: 83 (04-03-20 @ 04:50)  BP: 106/68 (04-03-20 @ 04:50)  RR: 18 (04-03-20 @ 04:50)  SpO2: 93% (04-03-20 @ 04:50)  Wt(kg): --  General: Nontoxic-appearing Male in no acute distress.  HEENT: AT/NC. . Anicteric. +NC  Neck: Not rigid. No sense of mass.  Nodes: None palpable.  Lungs: Clear bilaterally without rales, wheezing or rhonchi  Heart: Regular rate and rhythm.   Abdomen: Bowel sounds present and normoactive. Soft.   Back: No spinal tenderness. No costovertebral angle tenderness.   Extremities: No cyanosis or clubbing. No edema.   Skin: Warm. Dry. Good turgor. No rash. No vasculitic stigmata.  Psychiatric: Appropriate affect and mood for situation.         Laboratory Studies--  CBC                        14.1   6.65  )-----------( 206      ( 03 Apr 2020 11:20 )             41.7       Chemistries  04-03    138  |  105  |  12  ----------------------------<  127<H>  3.6   |  24  |  0.85    Ca    8.6      03 Apr 2020 11:20    TPro  8.2  /  Alb  2.8<L>  /  TBili  0.6  /  DBili  x   /  AST  75<H>  /  ALT  66  /  AlkPhos  93  04-03      Culture Data      Ferritin, Serum (04.02.20 @ 15:40)    Ferritin, Serum: 1244 ng/mL    Procalcitonin, Serum (04.02.20 @ 10:00)    Procalcitonin, Serum: 0.41: Procalcitonin (PCT) Interpretation (ng/mL) - Diagnosis of systemic  bacterial infection/sepsis  PCT < 0.5: Systemic infection (sepsis) is not likely and risk for  progression to severe systemic infection is low. Local bacterial  infection is possible. If early sepsis is suspected clinically, PCT  should be re-assessed in 6-24 hours.  PCT >/= 0.5 but < 2.0: Systemic infection (sepsis) is possible, but other  conditions are known to elevate PCT as well. Moderate risk for  progression to severe systemic infection. The patient should be closely  monitored both clinically and by re-assessing PCT within 6-24 hours.  PCT >/= 2.0 but < 10.0: Systemic infection (sepsis) is likely, unless  other causes are known. High risk of progression to severe systemic  infection (severe sepsis/septic shock).  PCT >/= 10.0: Important systemic inflammatory response, almost  exclusively due to severe bacterial sepsis or septic shock. High  likelihood of severe sepsis or septic shock. ng/mL        C-Reactive Protein, Serum (04.02.20 @ 15:19)    C-Reactive Protein, Serum: 11.50 mg/dL

## 2020-04-03 NOTE — PROGRESS NOTE ADULT - ASSESSMENT
CHARTING IN PROGRESS    43 yo male who presents to the ED with a cc of difficulty breathing and chest discomfort. Admitted for r/o COVID. 45 yo male who presents to the ED with a cc of difficulty breathing and chest discomfort. Admitted for r/o COVID.

## 2020-04-03 NOTE — PROGRESS NOTE ADULT - SUBJECTIVE AND OBJECTIVE BOX
CHARTING IN PROGRESS    HPI:  45 yo male who presents to the ED with a cc of difficulty breathing and chest discomfort.  Pt reports that he has no known medical issues.  Reports that he has been feeling unwell since last Wednesday.  Pt reports that last Wednesday he developed subjective fevers, chills, fatigue, diffuse myalgias, and a non productive cough.  He reports that symptoms continued and appears to have worsened and then over the last 2-3 days he developed chest tightness most noted on inspiration with shortness or breath.  Denies N/V/D/C, abd pain, ext numbness or weakness.  Pt reports that he is not a smoker and has no underlying lung pathology.  Denies noting any sick contacts.  He has not been seen for these symptoms prior to today.    The date the pt first felt unwell:   Fever or chills: yes [x  ]   no [  ]   - Tmax:   Fatigue, malaise or generalized weakness: yes [ x ]   no [  ]  Shortness of breath/dyspnea: yes [ x ]   no [  ]  Cough: yes [ x ]   no [  ], sputum production: yes [  ]   no [x  ]  Anorexia/po intolerance: yes [  ]   no [ x ]  Chest pain or chest tightness: yes [ x ]   no [  ]  Myalgias: yes [  ]   no [ x ]  Headache: yes [  ]   no [ x ]  Sore throat: yes [  ]   no [x  ]  Rhinorrhea: yes [  ]   no [ x ]  Abd pain: yes [  ]   no [ x ]  Nausea: yes [  ]   no [ x ]  Vomiting: yes [  ]   no [ x ]  Diarrhea: yes [  ]   no [x  ]  Loss of sense of smell/anosmia: yes [  ]   no [ x ]  Conjunctivitis: yes [  ]   no [ x ]  Recent travel: yes [  ]   no [ x ] - Location:   Any sick contacts: yes [  ]   no [ x ]  Close contact with someone confirmed positive with COVID-19 / SARS-CoV2 in the last 14 days: yes [  ]   no [x  ]  Other associated complaints:   Code status: FULL CODE (01 Apr 2020 02:18)    REVIEW OF SYSTEMS:    CONSTITUTIONAL: No weakness, fevers or chills  EYES/ENT: No visual changes, no throat pain   RESPIRATORY: No cough, wheezing, hemoptysis; No shortness of breath  CARDIOVASCULAR: No chest pain or palpitations  GASTROINTESTINAL: No abdominal, nausea, vomiting, or hematemesis; No diarrhea or constipation. No melena or hematochezia.  GENITOURINARY: No dysuria, frequency or hematuria  NEUROLOGICAL: No dizziness, numbness, or weakness  SKIN: No itching, burning, rashes, or lesions   All other review of systems is negative unless indicated above.    VITAL SIGNS:        PHYSICAL EXAM:     GENERAL: no acute distress  HEENT: NC/AT, EOMI, neck supple, MMM  RESPIRATORY: LCTAB/L, no rhonchi, rales, or wheezing  CARDIOVASCULAR: RRR, no murmurs, gallops, rubs  ABDOMINAL: soft, non-tender, non-distended, positive bowel sounds   EXTREMITIES: no clubbing, cyanosis, or edema  NEUROLOGICAL: alert and oriented x 3, non-focal  SKIN: no rashes or lesions   MUSCULOSKELETAL: no gross joint deformity                          14.7   9.51  )-----------( 179      ( 02 Apr 2020 10:00 )             43.1     04-02    137  |  104  |  10  ----------------------------<  108<H>  4.1   |  23  |  0.99    Ca    8.7      02 Apr 2020 10:00    TPro  8.1  /  Alb  3.1<L>  /  TBili  0.7  /  DBili  x   /  AST  68<H>  /  ALT  61  /  AlkPhos  94  04-02      CAPILLARY BLOOD GLUCOSE      POCT Blood Glucose.: 124 mg/dL (02 Apr 2020 17:19)      MEDICATIONS  (STANDING):  enoxaparin Injectable 40 milliGRAM(s) SubCutaneous daily HPI:  45 yo male who presents to the ED with a cc of difficulty breathing and chest discomfort.  Pt reports that he has no known medical issues.  Reports that he has been feeling unwell since last Wednesday.  Pt reports that last Wednesday he developed subjective fevers, chills, fatigue, diffuse myalgias, and a non productive cough.  He reports that symptoms continued and appears to have worsened and then over the last 2-3 days he developed chest tightness most noted on inspiration with shortness or breath.  Denies N/V/D/C, abd pain, ext numbness or weakness.  Pt reports that he is not a smoker and has no underlying lung pathology.  Denies noting any sick contacts.  He has not been seen for these symptoms prior to today.    4/3/20- Patient seen and examined at bedside. States he feels well, patient still with fevers, but denies any CP, SOB. Still has mild cough. [employer] used. Patient candidate to go home on home O2, but as patient with no insurance, can not receive home O2 and as patient desaturating off O2, needs to stay in hospital for further management and treatment.    Code status: FULL CODE (01 Apr 2020 02:18)    REVIEW OF SYSTEMS:    CONSTITUTIONAL: No weakness, +fevers  EYES/ENT: No visual changes, no throat pain   RESPIRATORY: + cough,  no wheezing, hemoptysis; No shortness of breath  CARDIOVASCULAR: No chest pain or palpitations  GASTROINTESTINAL: No abdominal, nausea, vomiting, or hematemesis; No diarrhea or constipation. No melena or hematochezia.  GENITOURINARY: No dysuria, frequency or hematuria  NEUROLOGICAL: No dizziness, numbness, or weakness  SKIN: No itching, burning, rashes, or lesions   All other review of systems is negative unless indicated above.    VITAL SIGNS:        PHYSICAL EXAM:     GENERAL: no acute distress  HEENT: NC/AT, EOMI, neck supple, MMM  RESPIRATORY: +bibasilar coarse breath sounds  CARDIOVASCULAR: RRR, no murmurs, gallops, rubs  ABDOMINAL: soft, non-tender, non-distended, positive bowel sounds   EXTREMITIES: no clubbing, cyanosis, or edema  NEUROLOGICAL: alert and oriented x 3, non-focal  SKIN: no rashes or lesions   MUSCULOSKELETAL: no gross joint deformity                          14.7   9.51  )-----------( 179      ( 02 Apr 2020 10:00 )             43.1     04-02    137  |  104  |  10  ----------------------------<  108<H>  4.1   |  23  |  0.99    Ca    8.7      02 Apr 2020 10:00    TPro  8.1  /  Alb  3.1<L>  /  TBili  0.7  /  DBili  x   /  AST  68<H>  /  ALT  61  /  AlkPhos  94  04-02      CAPILLARY BLOOD GLUCOSE      POCT Blood Glucose.: 124 mg/dL (02 Apr 2020 17:19)      MEDICATIONS  (STANDING):  enoxaparin Injectable 40 milliGRAM(s) SubCutaneous daily

## 2020-04-04 LAB
ALBUMIN SERPL ELPH-MCNC: 2.9 G/DL — LOW (ref 3.3–5)
ALP SERPL-CCNC: 92 U/L — SIGNIFICANT CHANGE UP (ref 40–120)
ALT FLD-CCNC: 83 U/L — HIGH (ref 12–78)
ANION GAP SERPL CALC-SCNC: 8 MMOL/L — SIGNIFICANT CHANGE UP (ref 5–17)
AST SERPL-CCNC: 88 U/L — HIGH (ref 15–37)
BASOPHILS # BLD AUTO: 0.03 K/UL — SIGNIFICANT CHANGE UP (ref 0–0.2)
BASOPHILS NFR BLD AUTO: 0.5 % — SIGNIFICANT CHANGE UP (ref 0–2)
BILIRUB SERPL-MCNC: 0.6 MG/DL — SIGNIFICANT CHANGE UP (ref 0.2–1.2)
BUN SERPL-MCNC: 13 MG/DL — SIGNIFICANT CHANGE UP (ref 7–23)
CALCIUM SERPL-MCNC: 8.8 MG/DL — SIGNIFICANT CHANGE UP (ref 8.5–10.1)
CHLORIDE SERPL-SCNC: 106 MMOL/L — SIGNIFICANT CHANGE UP (ref 96–108)
CK MB BLD-MCNC: <0.3 % — SIGNIFICANT CHANGE UP (ref 0–3.5)
CK MB CFR SERPL CALC: <1 NG/ML — SIGNIFICANT CHANGE UP (ref 0–3.6)
CK SERPL-CCNC: 386 U/L — HIGH (ref 26–308)
CO2 SERPL-SCNC: 24 MMOL/L — SIGNIFICANT CHANGE UP (ref 22–31)
CREAT SERPL-MCNC: 0.7 MG/DL — SIGNIFICANT CHANGE UP (ref 0.5–1.3)
CRP SERPL-MCNC: 4.62 MG/DL — HIGH (ref 0–0.4)
CRP SERPL-MCNC: 4.86 MG/DL — HIGH (ref 0–0.4)
D DIMER BLD IA.RAPID-MCNC: 241 NG/ML DDU — HIGH
EOSINOPHIL # BLD AUTO: 0.02 K/UL — SIGNIFICANT CHANGE UP (ref 0–0.5)
EOSINOPHIL NFR BLD AUTO: 0.4 % — SIGNIFICANT CHANGE UP (ref 0–6)
FERRITIN SERPL-MCNC: 1349 NG/ML — HIGH (ref 30–400)
FERRITIN SERPL-MCNC: 1405 NG/ML — HIGH (ref 30–400)
GLUCOSE SERPL-MCNC: 109 MG/DL — HIGH (ref 70–99)
HCT VFR BLD CALC: 41.7 % — SIGNIFICANT CHANGE UP (ref 39–50)
HGB BLD-MCNC: 14.3 G/DL — SIGNIFICANT CHANGE UP (ref 13–17)
IMM GRANULOCYTES NFR BLD AUTO: 0.5 % — SIGNIFICANT CHANGE UP (ref 0–1.5)
LDH SERPL L TO P-CCNC: 405 U/L — HIGH (ref 50–242)
LDH SERPL L TO P-CCNC: 524 U/L — HIGH (ref 50–242)
LYMPHOCYTES # BLD AUTO: 1.78 K/UL — SIGNIFICANT CHANGE UP (ref 1–3.3)
LYMPHOCYTES # BLD AUTO: 31.2 % — SIGNIFICANT CHANGE UP (ref 13–44)
MCHC RBC-ENTMCNC: 29 PG — SIGNIFICANT CHANGE UP (ref 27–34)
MCHC RBC-ENTMCNC: 34.3 GM/DL — SIGNIFICANT CHANGE UP (ref 32–36)
MCV RBC AUTO: 84.6 FL — SIGNIFICANT CHANGE UP (ref 80–100)
MONOCYTES # BLD AUTO: 0.5 K/UL — SIGNIFICANT CHANGE UP (ref 0–0.9)
MONOCYTES NFR BLD AUTO: 8.8 % — SIGNIFICANT CHANGE UP (ref 2–14)
NEUTROPHILS # BLD AUTO: 3.34 K/UL — SIGNIFICANT CHANGE UP (ref 1.8–7.4)
NEUTROPHILS NFR BLD AUTO: 58.6 % — SIGNIFICANT CHANGE UP (ref 43–77)
NRBC # BLD: 0 /100 WBCS — SIGNIFICANT CHANGE UP (ref 0–0)
PLATELET # BLD AUTO: 259 K/UL — SIGNIFICANT CHANGE UP (ref 150–400)
POTASSIUM SERPL-MCNC: 3.7 MMOL/L — SIGNIFICANT CHANGE UP (ref 3.5–5.3)
POTASSIUM SERPL-SCNC: 3.7 MMOL/L — SIGNIFICANT CHANGE UP (ref 3.5–5.3)
PROCALCITONIN SERPL-MCNC: 0.23 NG/ML — HIGH (ref 0–0.04)
PROT SERPL-MCNC: 8.1 G/DL — SIGNIFICANT CHANGE UP (ref 6–8.3)
RBC # BLD: 4.93 M/UL — SIGNIFICANT CHANGE UP (ref 4.2–5.8)
RBC # FLD: 13.1 % — SIGNIFICANT CHANGE UP (ref 10.3–14.5)
SODIUM SERPL-SCNC: 138 MMOL/L — SIGNIFICANT CHANGE UP (ref 135–145)
TROPONIN I SERPL-MCNC: <.015 NG/ML — SIGNIFICANT CHANGE UP (ref 0.01–0.04)
WBC # BLD: 5.7 K/UL — SIGNIFICANT CHANGE UP (ref 3.8–10.5)
WBC # FLD AUTO: 5.7 K/UL — SIGNIFICANT CHANGE UP (ref 3.8–10.5)

## 2020-04-04 PROCEDURE — 93010 ELECTROCARDIOGRAM REPORT: CPT

## 2020-04-04 PROCEDURE — 99232 SBSQ HOSP IP/OBS MODERATE 35: CPT

## 2020-04-04 RX ADMIN — ENOXAPARIN SODIUM 40 MILLIGRAM(S): 100 INJECTION SUBCUTANEOUS at 11:41

## 2020-04-04 NOTE — PROGRESS NOTE ADULT - SUBJECTIVE AND OBJECTIVE BOX
CHARTING IN PROGRESS    HPI:  43 yo male who presents to the ED with a cc of difficulty breathing and chest discomfort.  Pt reports that he has no known medical issues.  Reports that he has been feeling unwell since last Wednesday.  Pt reports that last Wednesday he developed subjective fevers, chills, fatigue, diffuse myalgias, and a non productive cough.  He reports that symptoms continued and appears to have worsened and then over the last 2-3 days he developed chest tightness most noted on inspiration with shortness or breath.  Denies N/V/D/C, abd pain, ext numbness or weakness.  Pt reports that he is not a smoker and has no underlying lung pathology.  Denies noting any sick contacts.  He has not been seen for these symptoms prior to today.    The date the pt first felt unwell:   Fever or chills: yes [x  ]   no [  ]   - Tmax:   Fatigue, malaise or generalized weakness: yes [ x ]   no [  ]  Shortness of breath/dyspnea: yes [ x ]   no [  ]  Cough: yes [ x ]   no [  ], sputum production: yes [  ]   no [x  ]  Anorexia/po intolerance: yes [  ]   no [ x ]  Chest pain or chest tightness: yes [ x ]   no [  ]  Myalgias: yes [  ]   no [ x ]  Headache: yes [  ]   no [ x ]  Sore throat: yes [  ]   no [x  ]  Rhinorrhea: yes [  ]   no [ x ]  Abd pain: yes [  ]   no [ x ]  Nausea: yes [  ]   no [ x ]  Vomiting: yes [  ]   no [ x ]  Diarrhea: yes [  ]   no [x  ]  Loss of sense of smell/anosmia: yes [  ]   no [ x ]  Conjunctivitis: yes [  ]   no [ x ]  Recent travel: yes [  ]   no [ x ] - Location:   Any sick contacts: yes [  ]   no [ x ]  Close contact with someone confirmed positive with COVID-19 / SARS-CoV2 in the last 14 days: yes [  ]   no [x  ]  Other associated complaints:   Code status: FULL CODE (01 Apr 2020 02:18)      INTERVAL EVENTS:      REVIEW OF SYSTEMS:  CONSTITUTIONAL: No weakness, fevers or chills  EYES/ENT: No visual changes, no throat pain   RESPIRATORY: No cough, wheezing, hemoptysis; No shortness of breath  CARDIOVASCULAR: No chest pain or palpitations  GASTROINTESTINAL: No abdominal, nausea, vomiting, or hematemesis; No diarrhea or constipation. No melena or hematochezia.  GENITOURINARY: No dysuria, frequency or hematuria  NEUROLOGICAL: No dizziness, numbness, or weakness  SKIN: No itching, burning, rashes, or lesions   All other review of systems is negative unless indicated above.    VITAL SIGNS:  Vital Signs Last 24 Hrs  T(C): 37.1 (04 Apr 2020 05:11), Max: 37.1 (03 Apr 2020 15:03)  T(F): 98.7 (04 Apr 2020 05:11), Max: 98.7 (03 Apr 2020 15:03)  HR: 72 (04 Apr 2020 05:11) (72 - 85)  BP: 120/78 (04 Apr 2020 05:11) (115/71 - 120/78)  RR: 18 (04 Apr 2020 05:11) (18 - 19)  SpO2: 95% (04 Apr 2020 05:11) (92% - 95%)      PHYSICAL EXAM:   GENERAL: no acute distress  HEENT: NC/AT, EOMI, neck supple, MMM  RESPIRATORY: LCTAB/L, no rhonchi, rales, or wheezing  CARDIOVASCULAR: RRR, no murmurs, gallops, rubs  ABDOMINAL: soft, non-tender, non-distended, positive bowel sounds   EXTREMITIES: no clubbing, cyanosis, or edema  NEUROLOGICAL: alert and oriented x 3, non-focal  SKIN: no rashes or lesions   MUSCULOSKELETAL: no gross joint deformity                          14.3   5.70  )-----------( 259      ( 04 Apr 2020 08:27 )             41.7     04-04    138  |  106  |  13  ----------------------------<  109<H>  3.7   |  24  |  0.70    Ca    8.8      04 Apr 2020 08:27    TPro  8.1  /  Alb  2.9<L>  /  TBili  0.6  /  DBili  x   /  AST  88<H>  /  ALT  83<H>  /  AlkPhos  92  04-04        MEDICATIONS  (STANDING):  enoxaparin Injectable 40 milliGRAM(s) SubCutaneous daily HPI:  43 yo male who presents to the ED with a cc of difficulty breathing and chest discomfort.  Pt reports that he has no known medical issues.  Reports that he has been feeling unwell since last Wednesday.  Pt reports that last Wednesday he developed subjective fevers, chills, fatigue, diffuse myalgias, and a non productive cough.  He reports that symptoms continued and appears to have worsened and then over the last 2-3 days he developed chest tightness most noted on inspiration with shortness or breath.  Denies N/V/D/C, abd pain, ext numbness or weakness.  Pt reports that he is not a smoker and has no underlying lung pathology.  Denies noting any sick contacts.  He has not been seen for these symptoms prior to today.    The date the pt first felt unwell:   Fever or chills: yes [x  ]   no [  ]   - Tmax:   Fatigue, malaise or generalized weakness: yes [ x ]   no [  ]  Shortness of breath/dyspnea: yes [ x ]   no [  ]  Cough: yes [ x ]   no [  ], sputum production: yes [  ]   no [x  ]  Anorexia/po intolerance: yes [  ]   no [ x ]  Chest pain or chest tightness: yes [ x ]   no [  ]  Myalgias: yes [  ]   no [ x ]  Headache: yes [  ]   no [ x ]  Sore throat: yes [  ]   no [x  ]  Rhinorrhea: yes [  ]   no [ x ]  Abd pain: yes [  ]   no [ x ]  Nausea: yes [  ]   no [ x ]  Vomiting: yes [  ]   no [ x ]  Diarrhea: yes [  ]   no [x  ]  Loss of sense of smell/anosmia: yes [  ]   no [ x ]  Conjunctivitis: yes [  ]   no [ x ]  Recent travel: yes [  ]   no [ x ] - Location:   Any sick contacts: yes [  ]   no [ x ]  Close contact with someone confirmed positive with COVID-19 / SARS-CoV2 in the last 14 days: yes [  ]   no [x  ]  Other associated complaints:   Code status: FULL CODE (01 Apr 2020 02:18)      INTERVAL EVENTS: Patient seen and examined at bedside. States he feels fine, but appears more tired today. Denies any CP, SOB, abd pain.      REVIEW OF SYSTEMS:  All other review of systems is negative unless indicated above.    VITAL SIGNS:  Vital Signs Last 24 Hrs  T(C): 37.1 (04 Apr 2020 05:11), Max: 37.1 (03 Apr 2020 15:03)  T(F): 98.7 (04 Apr 2020 05:11), Max: 98.7 (03 Apr 2020 15:03)  HR: 72 (04 Apr 2020 05:11) (72 - 85)  BP: 120/78 (04 Apr 2020 05:11) (115/71 - 120/78)  RR: 18 (04 Apr 2020 05:11) (18 - 19)  SpO2: 95% (04 Apr 2020 05:11) (92% - 95%)      PHYSICAL EXAM:   GENERAL: no acute distress  HEENT: NC/AT, EOMI, neck supple, MMM  RESPIRATORY: +bibasilar coarse breath sounds  CARDIOVASCULAR: RRR, no murmurs, gallops, rubs  ABDOMINAL: soft, non-tender, non-distended, positive bowel sounds   EXTREMITIES: no clubbing, cyanosis, or edema  NEUROLOGICAL: alert and oriented x 3, non-focal  SKIN: no rashes or lesions   MUSCULOSKELETAL: no gross joint deformity                          14.3   5.70  )-----------( 259      ( 04 Apr 2020 08:27 )             41.7     04-04    138  |  106  |  13  ----------------------------<  109<H>  3.7   |  24  |  0.70    Ca    8.8      04 Apr 2020 08:27    TPro  8.1  /  Alb  2.9<L>  /  TBili  0.6  /  DBili  x   /  AST  88<H>  /  ALT  83<H>  /  AlkPhos  92  04-04        MEDICATIONS  (STANDING):  enoxaparin Injectable 40 milliGRAM(s) SubCutaneous daily

## 2020-04-04 NOTE — PROGRESS NOTE ADULT - PROBLEM SELECTOR PLAN 1
Acute hypoxic respiratory failure 2/2 PNA in setting of suspected COVID19 viral illness given clinical presentation, CXR with bilateral GGO  - Day 9 of symptoms  - continue O2 NC, may use NRB  -  AVOID HFNC/Bipap/Nebs  - Contact and droplet isolation precautions  - Check CRP, D-dimer, ferritin, procalc  - Tylenol prn for fever  - may use albuterol/ipratroprium inhaler  - Start supplemental oxygen therapy immediately if pt has respiratory distress target SpO2 > 94%  - NEWS2 score 4 (if >= 7 high risk, consider ICU consult)  - monitor respiratory status closely  - patient is at a low risk of decompensation at this time  - Code Status: FULL CODE  -daily CBC w diff to follow eos, lymphocytes and neutrophils and daily NLR calculation (NLR <3 low vs >5 high) -other labs that may be selectively used to risk stratify and predict clinical course,   Procalcitonin (<0.2 associated with more severe disease),  Ferritin (lower risk <450 vs >850) CRP (low risk <2 and higher risk >6) D-dimer (<1,000 vs >1,000) LDH, ESR, PT/PTT, CK, lactate, troponin, IL-6  -antibiotics only if there is concern for a bacterial process  -COVID positive Acute hypoxic respiratory failure 2/2 PNA in setting of suspected COVID19 viral illness given clinical presentation, CXR with bilateral GGO  - Day 10 of symptoms  - continue O2 NC, may use NRB  -  AVOID HFNC/Bipap/Nebs  - Contact and droplet isolation precautions  - Check CRP, D-dimer, ferritin, procalc  - Tylenol prn for fever  - may use albuterol/ipratroprium inhaler  - Start supplemental oxygen therapy immediately if pt has respiratory distress target SpO2 > 94%  - NEWS2 score 4 (if >= 7 high risk, consider ICU consult)  - monitor respiratory status closely  - patient is at a low risk of decompensation at this time  - Code Status: FULL CODE  -daily CBC w diff to follow eos, lymphocytes and neutrophils and daily NLR calculation (NLR <3 low vs >5 high) -other labs that may be selectively used to risk stratify and predict clinical course,   Procalcitonin (<0.2 associated with more severe disease),  Ferritin (lower risk <450 vs >850) CRP (low risk <2 and higher risk >6) D-dimer (<1,000 vs >1,000) LDH, ESR, PT/PTT, CK, lactate, troponin, IL-6  -antibiotics only if there is concern for a bacterial process  -COVID positive

## 2020-04-04 NOTE — PROGRESS NOTE ADULT - ASSESSMENT
43 yo male adm with a cc of difficulty breathing and chest discomfort and is COVID+  COVID-19---high risk period for decompensation  (7-14 days post symptom onset), so critical to determine date of symptom onset,  avoid aerosolizing procedures,  focus on supportive care, avoid excessive blood draws so limit lab testing and draws - do recommend for hospitalized patients  -For all patients: daily BMP and CBC w diff to follow eos, lymphocytes and neutrophils and daily   NLR baseline and dynamic calculation (NLR <3 low vs >5 high) -other labs at admission to risk stratify and predict clinical course,  Procalcitonin (>0.2 associated with more severe disease),  Ferritin (lower risk <450 vs >850),  CRP (low risk <2 and higher risk >6) , D-dimer (<1,000 vs >1,000)   and if prognosis is unclear or if immunomodulators being considered: LDH, ESR, PT/PTT, CK, total, CKMB mass assay, lactate, troponin, IL-6 (and other interleukins as indicated)  -antibiotics only if there is concern for a bacterial process, HCQ 400mg PO BID day 1 followed by 200mg PO BID x 4 days may play a role if started early in disease, noted that methylprednisolone (SOLU-medrol) 1mg/kg/day IV x 5 days at onset of increased oxygen requirement (potential to finish w PO) and IL6 inhibition during this critical window may be associated with avoiding intubation and improved outcomes    3/25 symptom onset window 4/1-8  4/2 NLR 7.04/2.09 =3.4 an on NC  4/3 NLR 3.83  4/4 NLR 4.34/1.78=2.4 and pt on NC- and doing well so very optimistic regarding prognosis  When pt is doing well and either past the critical second week when decompensation can occur or has started to improve, fine to consider discharge planning with hope for early but safe discharge. Expedited discharge with home oxygen and even persistent fever at time of discharge is reasonable.  When patient stable for discharge per standard criteria from an ID standpoint  would consider patient potentially infectious at time of discharge and would discharge to home quarantine if possible. Patient will need to either be able return to an isolated situation where they can care for themselves or be with a care giver who has been previously exposed and/or is will to accept and mitigate the infection risk.   Since there are both test-based and non-test based approaches regarding recommendations regarding ending quarantine and these are rapidly changing, recommend this issue be deferred to patient's primary outpatient physician to address and for them to coordinate with [facility] (as per CDC guidelines which are being updated as more information becomes available).

## 2020-04-04 NOTE — PROGRESS NOTE ADULT - ASSESSMENT
CHARTING IN PROGRESS    45 yo male who presents to the ED with a cc of difficulty breathing and chest discomfort. Admitted for r/o COVID. 43 yo male who presents to the ED with a cc of difficulty breathing and chest discomfort. Admitted for r/o COVID.

## 2020-04-04 NOTE — PROGRESS NOTE ADULT - SUBJECTIVE AND OBJECTIVE BOX
infectious diseases progress note:    ILANA BAEZA is a 44y y. o. Male patient    COVID Patient    Allergies    No Known Allergies    Intolerances        ANTIBIOTICS/RELEVANT:  antimicrobials    immunologic:    OTHER:  acetaminophen   Tablet .. 650 milliGRAM(s) Oral every 4 hours PRN  ALBUTerol    90 MICROgram(s) HFA Inhaler 2 Puff(s) Inhalation every 4 hours PRN  benzonatate 100 milliGRAM(s) Oral three times a day PRN  enoxaparin Injectable 40 milliGRAM(s) SubCutaneous daily      Objective:  Vital Signs Last 24 Hrs  T(C): 37.1 (04 Apr 2020 05:11), Max: 37.1 (03 Apr 2020 15:03)  T(F): 98.7 (04 Apr 2020 05:11), Max: 98.7 (03 Apr 2020 15:03)  HR: 72 (04 Apr 2020 05:11) (72 - 85)  BP: 120/78 (04 Apr 2020 05:11) (115/71 - 120/78)  BP(mean): --  RR: 18 (04 Apr 2020 05:11) (18 - 19)  SpO2: 95% (04 Apr 2020 05:11) (92% - 95%)    T(C): 37.1 (04-04-20 @ 05:11), Max: 37.7 (04-02-20 @ 14:44)  T(C): 37.1 (04-04-20 @ 05:11), Max: 39.5 (04-01-20 @ 12:20)  T(C): 37.1 (04-04-20 @ 05:11), Max: 39.5 (03-31-20 @ 22:52)    PHYSICAL EXAM:  HEENT: NC atraumatic  Neck: supple  Respiratory: no accessory muscle use, breathing comfortably  Cardiovascular: distant  Gastrointestinal: normal appearing, nondistended  Extremities: no clubbing, no cyanosis,      LABS:                          14.3   5.70  )-----------( 259      ( 04 Apr 2020 08:27 )             41.7       5.70 04-04 @ 08:27  6.65 04-03 @ 11:20  9.51 04-02 @ 10:00  6.34 03-31 @ 23:22      04-04    138  |  106  |  13  ----------------------------<  109<H>  3.7   |  24  |  0.70    Ca    8.8      04 Apr 2020 08:27    TPro  8.1  /  Alb  2.9<L>  /  TBili  0.6  /  DBili  x   /  AST  88<H>  /  ALT  83<H>  /  AlkPhos  92  04-04      Creatinine, Serum: 0.70 mg/dL (04-04-20 @ 08:27)  Creatinine, Serum: 0.85 mg/dL (04-03-20 @ 11:20)  Creatinine, Serum: 0.99 mg/dL (04-02-20 @ 10:00)  Creatinine, Serum: 1.00 mg/dL (03-31-20 @ 23:22)                COVID RISK SCORE  Auto Neutrophil #: 3.34 K/uL (04-04-20 @ 08:27)  Auto Lymphocyte #: 1.78 K/uL (04-04-20 @ 08:27)  Auto Neutrophil #: 4.91 K/uL (04-03-20 @ 11:20)  Auto Lymphocyte #: 1.28 K/uL (04-03-20 @ 11:20)  Auto Neutrophil #: 7.04 K/uL (04-02-20 @ 10:00)  Auto Lymphocyte #: 2.09 K/uL (04-02-20 @ 10:00)  Auto Lymphocyte #: 1.04 K/uL (03-31-20 @ 23:22)  Auto Neutrophil #: 4.87 K/uL (03-31-20 @ 23:22)      Auto Eosinophil #: 0.02 K/uL (04-04-20 @ 08:27)  Auto Eosinophil #: 0.00 K/uL (04-03-20 @ 11:20)  Auto Eosinophil #: 0.00 K/uL (04-02-20 @ 10:00)  Auto Eosinophil #: 0.01 K/uL (03-31-20 @ 23:22)    Lactate Dehydrogenase, Serum: 477 U/L (04-02-20 @ 15:19)    Sedimentation Rate, Erythrocyte: 53 mm/hr (04-02-20 @ 10:00)    Procalcitonin, Serum: 0.41 ng/mL (04-02-20 @ 10:00)            Ferritin, Serum: 1349 ng/mL (04-04-20 @ 11:32)  Ferritin, Serum: 1244 ng/mL (04-02-20 @ 15:40)          D-Dimer Assay, Quantitative: 241 ng/mL DDU (04-04-20 @ 08:27)  D-Dimer Assay, Quantitative: 229 ng/mL DDU (04-02-20 @ 10:00)        MICROBIOLOGY:              RADIOLOGY & ADDITIONAL STUDIES:

## 2020-04-05 LAB
ALBUMIN SERPL ELPH-MCNC: 2.8 G/DL — LOW (ref 3.3–5)
ALP SERPL-CCNC: 104 U/L — SIGNIFICANT CHANGE UP (ref 40–120)
ALT FLD-CCNC: 107 U/L — HIGH (ref 12–78)
ANION GAP SERPL CALC-SCNC: 9 MMOL/L — SIGNIFICANT CHANGE UP (ref 5–17)
AST SERPL-CCNC: 101 U/L — HIGH (ref 15–37)
BASOPHILS # BLD AUTO: 0.03 K/UL — SIGNIFICANT CHANGE UP (ref 0–0.2)
BASOPHILS NFR BLD AUTO: 0.5 % — SIGNIFICANT CHANGE UP (ref 0–2)
BILIRUB SERPL-MCNC: 0.8 MG/DL — SIGNIFICANT CHANGE UP (ref 0.2–1.2)
BUN SERPL-MCNC: 13 MG/DL — SIGNIFICANT CHANGE UP (ref 7–23)
CALCIUM SERPL-MCNC: 9 MG/DL — SIGNIFICANT CHANGE UP (ref 8.5–10.1)
CHLORIDE SERPL-SCNC: 107 MMOL/L — SIGNIFICANT CHANGE UP (ref 96–108)
CO2 SERPL-SCNC: 24 MMOL/L — SIGNIFICANT CHANGE UP (ref 22–31)
CREAT SERPL-MCNC: 0.82 MG/DL — SIGNIFICANT CHANGE UP (ref 0.5–1.3)
EOSINOPHIL # BLD AUTO: 0.07 K/UL — SIGNIFICANT CHANGE UP (ref 0–0.5)
EOSINOPHIL NFR BLD AUTO: 1.1 % — SIGNIFICANT CHANGE UP (ref 0–6)
GLUCOSE SERPL-MCNC: 103 MG/DL — HIGH (ref 70–99)
HCT VFR BLD CALC: 42.6 % — SIGNIFICANT CHANGE UP (ref 39–50)
HGB BLD-MCNC: 14.6 G/DL — SIGNIFICANT CHANGE UP (ref 13–17)
IMM GRANULOCYTES NFR BLD AUTO: 0.8 % — SIGNIFICANT CHANGE UP (ref 0–1.5)
LYMPHOCYTES # BLD AUTO: 1.84 K/UL — SIGNIFICANT CHANGE UP (ref 1–3.3)
LYMPHOCYTES # BLD AUTO: 29 % — SIGNIFICANT CHANGE UP (ref 13–44)
MCHC RBC-ENTMCNC: 29.3 PG — SIGNIFICANT CHANGE UP (ref 27–34)
MCHC RBC-ENTMCNC: 34.3 GM/DL — SIGNIFICANT CHANGE UP (ref 32–36)
MCV RBC AUTO: 85.5 FL — SIGNIFICANT CHANGE UP (ref 80–100)
MONOCYTES # BLD AUTO: 0.65 K/UL — SIGNIFICANT CHANGE UP (ref 0–0.9)
MONOCYTES NFR BLD AUTO: 10.3 % — SIGNIFICANT CHANGE UP (ref 2–14)
NEUTROPHILS # BLD AUTO: 3.7 K/UL — SIGNIFICANT CHANGE UP (ref 1.8–7.4)
NEUTROPHILS NFR BLD AUTO: 58.3 % — SIGNIFICANT CHANGE UP (ref 43–77)
NRBC # BLD: 0 /100 WBCS — SIGNIFICANT CHANGE UP (ref 0–0)
PLATELET # BLD AUTO: 299 K/UL — SIGNIFICANT CHANGE UP (ref 150–400)
POTASSIUM SERPL-MCNC: 4.1 MMOL/L — SIGNIFICANT CHANGE UP (ref 3.5–5.3)
POTASSIUM SERPL-SCNC: 4.1 MMOL/L — SIGNIFICANT CHANGE UP (ref 3.5–5.3)
PROT SERPL-MCNC: 8.4 G/DL — HIGH (ref 6–8.3)
RBC # BLD: 4.98 M/UL — SIGNIFICANT CHANGE UP (ref 4.2–5.8)
RBC # FLD: 13 % — SIGNIFICANT CHANGE UP (ref 10.3–14.5)
SODIUM SERPL-SCNC: 140 MMOL/L — SIGNIFICANT CHANGE UP (ref 135–145)
WBC # BLD: 6.34 K/UL — SIGNIFICANT CHANGE UP (ref 3.8–10.5)
WBC # FLD AUTO: 6.34 K/UL — SIGNIFICANT CHANGE UP (ref 3.8–10.5)

## 2020-04-05 PROCEDURE — 99232 SBSQ HOSP IP/OBS MODERATE 35: CPT

## 2020-04-05 RX ADMIN — ENOXAPARIN SODIUM 40 MILLIGRAM(S): 100 INJECTION SUBCUTANEOUS at 11:29

## 2020-04-05 NOTE — PROGRESS NOTE ADULT - SUBJECTIVE AND OBJECTIVE BOX
CHARTING IN PROGRESS    HPI:  45 yo male who presents to the ED with a cc of difficulty breathing and chest discomfort.  Pt reports that he has no known medical issues.  Reports that he has been feeling unwell since last Wednesday.  Pt reports that last Wednesday he developed subjective fevers, chills, fatigue, diffuse myalgias, and a non productive cough.  He reports that symptoms continued and appears to have worsened and then over the last 2-3 days he developed chest tightness most noted on inspiration with shortness or breath.  Denies N/V/D/C, abd pain, ext numbness or weakness.  Pt reports that he is not a smoker and has no underlying lung pathology.  Denies noting any sick contacts.  He has not been seen for these symptoms prior to today.    The date the pt first felt unwell:   Fever or chills: yes [x  ]   no [  ]   - Tmax:   Fatigue, malaise or generalized weakness: yes [ x ]   no [  ]  Shortness of breath/dyspnea: yes [ x ]   no [  ]  Cough: yes [ x ]   no [  ], sputum production: yes [  ]   no [x  ]  Anorexia/po intolerance: yes [  ]   no [ x ]  Chest pain or chest tightness: yes [ x ]   no [  ]  Myalgias: yes [  ]   no [ x ]  Headache: yes [  ]   no [ x ]  Sore throat: yes [  ]   no [x  ]  Rhinorrhea: yes [  ]   no [ x ]  Abd pain: yes [  ]   no [ x ]  Nausea: yes [  ]   no [ x ]  Vomiting: yes [  ]   no [ x ]  Diarrhea: yes [  ]   no [x  ]  Loss of sense of smell/anosmia: yes [  ]   no [ x ]  Conjunctivitis: yes [  ]   no [ x ]  Recent travel: yes [  ]   no [ x ] - Location:   Any sick contacts: yes [  ]   no [ x ]  Close contact with someone confirmed positive with COVID-19 / SARS-CoV2 in the last 14 days: yes [  ]   no [x  ]  Other associated complaints:   Code status: FULL CODE (01 Apr 2020 02:18)      INTERVAL EVENTS:      REVIEW OF SYSTEMS:    CONSTITUTIONAL: No weakness, fevers or chills  EYES/ENT: No visual changes, no throat pain   RESPIRATORY: No cough, wheezing, hemoptysis; No shortness of breath  CARDIOVASCULAR: No chest pain or palpitations  GASTROINTESTINAL: No abdominal, nausea, vomiting, or hematemesis; No diarrhea or constipation. No melena or hematochezia.  GENITOURINARY: No dysuria, frequency or hematuria  NEUROLOGICAL: No dizziness, numbness, or weakness  SKIN: No itching, burning, rashes, or lesions   All other review of systems is negative unless indicated above.    VITAL SIGNS:        PHYSICAL EXAM:     GENERAL: no acute distress  HEENT: NC/AT, EOMI, neck supple, MMM  RESPIRATORY: LCTAB/L, no rhonchi, rales, or wheezing  CARDIOVASCULAR: RRR, no murmurs, gallops, rubs  ABDOMINAL: soft, non-tender, non-distended, positive bowel sounds   EXTREMITIES: no clubbing, cyanosis, or edema  NEUROLOGICAL: alert and oriented x 3, non-focal  SKIN: no rashes or lesions   MUSCULOSKELETAL: no gross joint deformity                          14.3   5.70  )-----------( 259      ( 04 Apr 2020 08:27 )             41.7     04-04    138  |  106  |  13  ----------------------------<  109<H>  3.7   |  24  |  0.70    Ca    8.8      04 Apr 2020 08:27    TPro  8.1  /  Alb  2.9<L>  /  TBili  0.6  /  DBili  x   /  AST  88<H>  /  ALT  83<H>  /  AlkPhos  92  04-04      CAPILLARY BLOOD GLUCOSE          MEDICATIONS  (STANDING):  enoxaparin Injectable 40 milliGRAM(s) SubCutaneous daily HPI:  45 yo male who presents to the ED with a cc of difficulty breathing and chest discomfort.  Pt reports that he has no known medical issues.  Reports that he has been feeling unwell since last Wednesday.  Pt reports that last Wednesday he developed subjective fevers, chills, fatigue, diffuse myalgias, and a non productive cough.  He reports that symptoms continued and appears to have worsened and then over the last 2-3 days he developed chest tightness most noted on inspiration with shortness or breath.  Denies N/V/D/C, abd pain, ext numbness or weakness.  Pt reports that he is not a smoker and has no underlying lung pathology.  Denies noting any sick contacts.  He has not been seen for these symptoms prior to today.    INTERVAL EVENTS: Patient seen and examined at bedside. States he feels better today, denies any CP, SOB, abd pain.      REVIEW OF SYSTEMS:  All other review of systems is negative unless indicated above.    VITAL SIGNS:  Vital Signs Last 24 Hrs  T(C): 37 (05 Apr 2020 14:34), Max: 37.6 (04 Apr 2020 15:58)  T(F): 98.6 (05 Apr 2020 14:34), Max: 99.6 (04 Apr 2020 15:58)  HR: 85 (05 Apr 2020 14:34) (75 - 85)  BP: 118/75 (05 Apr 2020 14:34) (118/72 - 122/71)  BP(mean): --  RR: 17 (05 Apr 2020 14:34) (17 - 18)  SpO2: 95% (05 Apr 2020 14:34) (94% - 96%)      PHYSICAL EXAM:   GENERAL: no acute distress  HEENT: NC/AT, EOMI, neck supple, MMM  RESPIRATORY: +bibasilar crackles  CARDIOVASCULAR: RRR, no murmurs, gallops, rubs  ABDOMINAL: soft, non-tender, non-distended, positive bowel sounds   EXTREMITIES: no clubbing, cyanosis, or edema  NEUROLOGICAL: alert and oriented x 3, non-focal  SKIN: no rashes or lesions   MUSCULOSKELETAL: no gross joint deformity                          14.3   5.70  )-----------( 259      ( 04 Apr 2020 08:27 )             41.7     04-04    138  |  106  |  13  ----------------------------<  109<H>  3.7   |  24  |  0.70    Ca    8.8      04 Apr 2020 08:27    TPro  8.1  /  Alb  2.9<L>  /  TBili  0.6  /  DBili  x   /  AST  88<H>  /  ALT  83<H>  /  AlkPhos  92  04-04      CAPILLARY BLOOD GLUCOSE          MEDICATIONS  (STANDING):  enoxaparin Injectable 40 milliGRAM(s) SubCutaneous daily

## 2020-04-05 NOTE — PROGRESS NOTE ADULT - PROBLEM SELECTOR PLAN 1
Acute hypoxic respiratory failure 2/2 PNA in setting of suspected COVID19 viral illness given clinical presentation, CXR with bilateral GGO  - Day 10 of symptoms  - continue O2 NC, may use NRB  -  AVOID HFNC/Bipap/Nebs  - Contact and droplet isolation precautions  - Check CRP, D-dimer, ferritin, procalc  - Tylenol prn for fever  - may use albuterol/ipratroprium inhaler  - Start supplemental oxygen therapy immediately if pt has respiratory distress target SpO2 > 94%  - NEWS2 score 4 (if >= 7 high risk, consider ICU consult)  - monitor respiratory status closely  - patient is at a low risk of decompensation at this time  - Code Status: FULL CODE  -daily CBC w diff to follow eos, lymphocytes and neutrophils and daily NLR calculation (NLR <3 low vs >5 high) -other labs that may be selectively used to risk stratify and predict clinical course,   Procalcitonin (<0.2 associated with more severe disease),  Ferritin (lower risk <450 vs >850) CRP (low risk <2 and higher risk >6) D-dimer (<1,000 vs >1,000) LDH, ESR, PT/PTT, CK, lactate, troponin, IL-6  -antibiotics only if there is concern for a bacterial process  -COVID positive Acute hypoxic respiratory failure 2/2 PNA in setting of suspected COVID19 viral illness given clinical presentation, CXR with bilateral GGO  - Day 11 of symptoms  - continue O2 NC, may use NRB  -  AVOID HFNC/Bipap/Nebs  - Contact and droplet isolation precautions  - Check CRP, D-dimer, ferritin, procalc  - Tylenol prn for fever  - may use albuterol/ipratroprium inhaler  - Start supplemental oxygen therapy immediately if pt has respiratory distress target SpO2 > 94%  - NEWS2 score 4 (if >= 7 high risk, consider ICU consult)  - monitor respiratory status closely  - patient is at a low risk of decompensation at this time  - Code Status: FULL CODE  -daily CBC w diff to follow eos, lymphocytes and neutrophils and daily NLR calculation (NLR <3 low vs >5 high) -other labs that may be selectively used to risk stratify and predict clinical course,   Procalcitonin (<0.2 associated with more severe disease),  Ferritin (lower risk <450 vs >850) CRP (low risk <2 and higher risk >6) D-dimer (<1,000 vs >1,000) LDH, ESR, PT/PTT, CK, lactate, troponin, IL-6  -antibiotics only if there is concern for a bacterial process  -COVID positive  -NLR 2.01 today, decreased from ventimask to NC- tolerated well

## 2020-04-06 LAB
ALBUMIN SERPL ELPH-MCNC: 2.8 G/DL — LOW (ref 3.3–5)
ALP SERPL-CCNC: 123 U/L — HIGH (ref 40–120)
ALT FLD-CCNC: 136 U/L — HIGH (ref 12–78)
ANION GAP SERPL CALC-SCNC: 9 MMOL/L — SIGNIFICANT CHANGE UP (ref 5–17)
AST SERPL-CCNC: 117 U/L — HIGH (ref 15–37)
BASOPHILS # BLD AUTO: 0.03 K/UL — SIGNIFICANT CHANGE UP (ref 0–0.2)
BASOPHILS NFR BLD AUTO: 0.5 % — SIGNIFICANT CHANGE UP (ref 0–2)
BILIRUB SERPL-MCNC: 0.8 MG/DL — SIGNIFICANT CHANGE UP (ref 0.2–1.2)
BUN SERPL-MCNC: 13 MG/DL — SIGNIFICANT CHANGE UP (ref 7–23)
CALCIUM SERPL-MCNC: 8.9 MG/DL — SIGNIFICANT CHANGE UP (ref 8.5–10.1)
CHLORIDE SERPL-SCNC: 104 MMOL/L — SIGNIFICANT CHANGE UP (ref 96–108)
CO2 SERPL-SCNC: 25 MMOL/L — SIGNIFICANT CHANGE UP (ref 22–31)
CREAT SERPL-MCNC: 0.71 MG/DL — SIGNIFICANT CHANGE UP (ref 0.5–1.3)
EOSINOPHIL # BLD AUTO: 0.22 K/UL — SIGNIFICANT CHANGE UP (ref 0–0.5)
EOSINOPHIL NFR BLD AUTO: 3.9 % — SIGNIFICANT CHANGE UP (ref 0–6)
GLUCOSE SERPL-MCNC: 92 MG/DL — SIGNIFICANT CHANGE UP (ref 70–99)
HCT VFR BLD CALC: 41.3 % — SIGNIFICANT CHANGE UP (ref 39–50)
HGB BLD-MCNC: 13.9 G/DL — SIGNIFICANT CHANGE UP (ref 13–17)
IMM GRANULOCYTES NFR BLD AUTO: 1.1 % — SIGNIFICANT CHANGE UP (ref 0–1.5)
LYMPHOCYTES # BLD AUTO: 1.75 K/UL — SIGNIFICANT CHANGE UP (ref 1–3.3)
LYMPHOCYTES # BLD AUTO: 30.7 % — SIGNIFICANT CHANGE UP (ref 13–44)
MCHC RBC-ENTMCNC: 28.9 PG — SIGNIFICANT CHANGE UP (ref 27–34)
MCHC RBC-ENTMCNC: 33.7 GM/DL — SIGNIFICANT CHANGE UP (ref 32–36)
MCV RBC AUTO: 85.9 FL — SIGNIFICANT CHANGE UP (ref 80–100)
MONOCYTES # BLD AUTO: 0.68 K/UL — SIGNIFICANT CHANGE UP (ref 0–0.9)
MONOCYTES NFR BLD AUTO: 11.9 % — SIGNIFICANT CHANGE UP (ref 2–14)
NEUTROPHILS # BLD AUTO: 2.96 K/UL — SIGNIFICANT CHANGE UP (ref 1.8–7.4)
NEUTROPHILS NFR BLD AUTO: 51.9 % — SIGNIFICANT CHANGE UP (ref 43–77)
NRBC # BLD: 0 /100 WBCS — SIGNIFICANT CHANGE UP (ref 0–0)
PLATELET # BLD AUTO: 334 K/UL — SIGNIFICANT CHANGE UP (ref 150–400)
POTASSIUM SERPL-MCNC: 3.6 MMOL/L — SIGNIFICANT CHANGE UP (ref 3.5–5.3)
POTASSIUM SERPL-SCNC: 3.6 MMOL/L — SIGNIFICANT CHANGE UP (ref 3.5–5.3)
PROT SERPL-MCNC: 8.1 G/DL — SIGNIFICANT CHANGE UP (ref 6–8.3)
RBC # BLD: 4.81 M/UL — SIGNIFICANT CHANGE UP (ref 4.2–5.8)
RBC # FLD: 12.9 % — SIGNIFICANT CHANGE UP (ref 10.3–14.5)
SODIUM SERPL-SCNC: 138 MMOL/L — SIGNIFICANT CHANGE UP (ref 135–145)
WBC # BLD: 5.7 K/UL — SIGNIFICANT CHANGE UP (ref 3.8–10.5)
WBC # FLD AUTO: 5.7 K/UL — SIGNIFICANT CHANGE UP (ref 3.8–10.5)

## 2020-04-06 PROCEDURE — 99232 SBSQ HOSP IP/OBS MODERATE 35: CPT

## 2020-04-06 RX ADMIN — Medication 100 MILLIGRAM(S): at 21:39

## 2020-04-06 RX ADMIN — ENOXAPARIN SODIUM 40 MILLIGRAM(S): 100 INJECTION SUBCUTANEOUS at 10:46

## 2020-04-06 NOTE — DISCHARGE NOTE PROVIDER - NSDCMRMEDTOKEN_GEN_ALL_CORE_FT
Home oxygen, O2 concentrator and portable unit. ICD- U07.1. Patient needs 3LPM NC: acetaminophen 325 mg oral tablet: 2 tab(s) orally every 4 hours, As needed, Temp greater or equal to 38.5C (101.3F)  albuterol 90 mcg/inh inhalation aerosol: 2 puff(s) inhaled every 4 hours, As needed, Shortness of Breath and/or Wheezing  Home oxygen, O2 concentrator and portable unit. ICD- U07.1. Patient needs 3LPM NC:   Tessalon Perles 100 mg oral capsule: 1 cap(s) orally 3 times a day, As needed, Cough

## 2020-04-06 NOTE — DISCHARGE NOTE PROVIDER - NSDCCPCAREPLAN_GEN_ALL_CORE_FT
PRINCIPAL DISCHARGE DIAGNOSIS  Diagnosis: SARS virus pneumonia  Assessment and Plan of Treatment: SARS virus pneumonia PRINCIPAL DISCHARGE DIAGNOSIS  Diagnosis: SARS virus pneumonia  Assessment and Plan of Treatment: You were found to have a lunch infection caused by novel coronavirus.   - Please continue isolation for a minimum of 14 days from symptom onset and until cleared by health care provider or [facility] Department of Health.  - Continue supportive care with plenty of hydration and rest.  - You can take Tylenol (acetaminophen) as needed for pain or fevers.  - Return to the ED if you have worsening symptoms including high fevers, shortness of breath or feel like you are going to pass out.

## 2020-04-06 NOTE — PROGRESS NOTE ADULT - SUBJECTIVE AND OBJECTIVE BOX
HPI:  45 yo male who presents to the ED with a cc of difficulty breathing and chest discomfort.  Pt reports that he has no known medical issues.  Reports that he has been feeling unwell since last Wednesday.  Pt reports that last Wednesday he developed subjective fevers, chills, fatigue, diffuse myalgias, and a non productive cough.  He reports that symptoms continued and appears to have worsened and then over the last 2-3 days he developed chest tightness most noted on inspiration with shortness or breath.  Denies N/V/D/C, abd pain, ext numbness or weakness.  Pt reports that he is not a smoker and has no underlying lung pathology.  Denies noting any sick contacts.  He has not been seen for these symptoms prior to today.    INTERVAL HISTORY:    REVIEW OF SYSTEMS:  All other review of systems is negative unless indicated above.    VITAL SIGNS:  Vital Signs Last 24 Hrs  T(C): 37.5 (06 Apr 2020 05:43), Max: 37.5 (06 Apr 2020 05:43)  T(F): 99.5 (06 Apr 2020 05:43), Max: 99.5 (06 Apr 2020 05:43)  HR: 79 (06 Apr 2020 05:43) (79 - 85)  BP: 105/68 (06 Apr 2020 05:43) (105/68 - 118/75)  BP(mean): --  RR: 18 (06 Apr 2020 05:43) (17 - 19)  SpO2: 92% (06 Apr 2020 05:43) (92% - 95%)      PHYSICAL EXAM:   GENERAL: no acute distress  HEENT: NC/AT, EOMI, neck supple, MMM  RESPIRATORY: LCTAB/L, no rhonchi, rales, or wheezing  CARDIOVASCULAR: RRR, no murmurs, gallops, rubs  ABDOMINAL: soft, non-tender, non-distended, positive bowel sounds   EXTREMITIES: no clubbing, cyanosis, or edema  NEUROLOGICAL: alert and oriented x 3, non-focal  SKIN: no rashes or lesions   MUSCULOSKELETAL: no gross joint deformity                          14.6   6.34  )-----------( 299      ( 05 Apr 2020 09:14 )             42.6     04-05    140  |  107  |  13  ----------------------------<  103<H>  4.1   |  24  |  0.82    Ca    9.0      05 Apr 2020 09:14    TPro  8.4<H>  /  Alb  2.8<L>  /  TBili  0.8  /  DBili  x   /  AST  101<H>  /  ALT  107<H>  /  AlkPhos  104  04-05      CAPILLARY BLOOD GLUCOSE          MEDICATIONS  (STANDING):  enoxaparin Injectable 40 milliGRAM(s) SubCutaneous daily HPI:  45 yo male who presents to the ED with a cc of difficulty breathing and chest discomfort.  Pt reports that he has no known medical issues.  Reports that he has been feeling unwell since last Wednesday.  Pt reports that last Wednesday he developed subjective fevers, chills, fatigue, diffuse myalgias, and a non productive cough.  He reports that symptoms continued and appears to have worsened and then over the last 2-3 days he developed chest tightness most noted on inspiration with shortness or breath.  Denies N/V/D/C, abd pain, ext numbness or weakness.  Pt reports that he is not a smoker and has no underlying lung pathology.  Denies noting any sick contacts.  He has not been seen for these symptoms prior to today.    INTERVAL HISTORY:  Patient seen and examined at bedside. States he feels well, denies any CP, SOB, abd pain, breathing improved.    REVIEW OF SYSTEMS:  All other review of systems is negative unless indicated above.    VITAL SIGNS:  Vital Signs Last 24 Hrs  T(C): 37.5 (06 Apr 2020 05:43), Max: 37.5 (06 Apr 2020 05:43)  T(F): 99.5 (06 Apr 2020 05:43), Max: 99.5 (06 Apr 2020 05:43)  HR: 79 (06 Apr 2020 05:43) (79 - 85)  BP: 105/68 (06 Apr 2020 05:43) (105/68 - 118/75)  BP(mean): --  RR: 18 (06 Apr 2020 05:43) (17 - 19)  SpO2: 92% (06 Apr 2020 05:43) (92% - 95%)      PHYSICAL EXAM:   GENERAL: no acute distress  HEENT: NC/AT, EOMI, neck supple, MMM  RESPIRATORY: +bibasilar crackles  CARDIOVASCULAR: RRR, no murmurs, gallops, rubs  ABDOMINAL: soft, non-tender, non-distended, positive bowel sounds   EXTREMITIES: no clubbing, cyanosis, or edema  NEUROLOGICAL: alert and oriented x 3, non-focal  SKIN: no rashes or lesions   MUSCULOSKELETAL: no gross joint deformity                          14.6   6.34  )-----------( 299      ( 05 Apr 2020 09:14 )             42.6     04-05    140  |  107  |  13  ----------------------------<  103<H>  4.1   |  24  |  0.82    Ca    9.0      05 Apr 2020 09:14    TPro  8.4<H>  /  Alb  2.8<L>  /  TBili  0.8  /  DBili  x   /  AST  101<H>  /  ALT  107<H>  /  AlkPhos  104  04-05      CAPILLARY BLOOD GLUCOSE          MEDICATIONS  (STANDING):  enoxaparin Injectable 40 milliGRAM(s) SubCutaneous daily

## 2020-04-06 NOTE — DISCHARGE NOTE PROVIDER - HOSPITAL COURSE
ADMISSION H+P:        HPI:    43 yo male who presents to the ED with a cc of difficulty breathing and chest discomfort.  Pt reports that he has no known medical issues.  Reports that he has been feeling unwell since last Wednesday.  Pt reports that last Wednesday he developed subjective fevers, chills, fatigue, diffuse myalgias, and a non productive cough.  He reports that symptoms continued and appears to have worsened and then over the last 2-3 days he developed chest tightness most noted on inspiration with shortness or breath.  Denies N/V/D/C, abd pain, ext numbness or weakness.  Pt reports that he is not a smoker and has no underlying lung pathology.  Denies noting any sick contacts.  He has not been seen for these symptoms prior to today.        The date the pt first felt unwell:     Fever or chills: yes [x  ]   no [  ]   - Tmax:     Fatigue, malaise or generalized weakness: yes [ x ]   no [  ]    Shortness of breath/dyspnea: yes [ x ]   no [  ]    Cough: yes [ x ]   no [  ], sputum production: yes [  ]   no [x  ]    Anorexia/po intolerance: yes [  ]   no [ x ]    Chest pain or chest tightness: yes [ x ]   no [  ]    Myalgias: yes [  ]   no [ x ]    Headache: yes [  ]   no [ x ]    Sore throat: yes [  ]   no [x  ]    Rhinorrhea: yes [  ]   no [ x ]    Abd pain: yes [  ]   no [ x ]    Nausea: yes [  ]   no [ x ]    Vomiting: yes [  ]   no [ x ]    Diarrhea: yes [  ]   no [x  ]    Loss of sense of smell/anosmia: yes [  ]   no [ x ]    Conjunctivitis: yes [  ]   no [ x ]    Recent travel: yes [  ]   no [ x ] - Location:     Any sick contacts: yes [  ]   no [ x ]    Close contact with someone confirmed positive with COVID-19 / SARS-CoV2 in the last 14 days: yes [  ]   no [x  ]    Other associated complaints:     Code status: FULL CODE (01 Apr 2020 02:18)            ---    HOSPITAL COURSE:         Patient was medically optimized and improved clinically throughout hospital course. Patient seen and examined on day of discharge.        Vital Signs    T(C): 37 (06 Apr 2020 15:23), Max: 37.5 (06 Apr 2020 05:43)    T(F): 98.6 (06 Apr 2020 15:23), Max: 99.5 (06 Apr 2020 05:43)    HR: 73 (06 Apr 2020 15:23) (73 - 80)    BP: 111/75 (06 Apr 2020 15:23) (105/68 - 116/72)    RR: 26 (06 Apr 2020 15:23) (18 - 26)    SpO2: 95% (06 Apr 2020 15:23) (91% - 95%)        Physical Exam:    General: well-developed, well-nourished, NAD    HEENT: normocephalic, atraumatic, EOMI, moist mucous membranes     Neck: supple, non-tender, no masses    Neurology: AAOx3, sensation intact    Respiratory: clear to auscultation bilaterally; no wheezes, rhonchi, or rales    CV: regular rate and rhythm, soft S1/S2, no murmurs, rubs, or gallops    Abdominal: soft, non-tender, non-distended, bowel sounds present    Extremities: no clubbing, cyanosis, or edema; palpable peripheral pulses    Musculoskeletal: no joint erythema or warmth, no joint swelling     Skin: warm, dry, normal color        Patient is medically stable for discharge to ____ with outpatient follow up.    ---    CONSULTANTS:         ---    FINAL DISCHARGE DIAGNOSIS LIST:    Please see last daily progress note for final discharge diagnoses ADMISSION H+P:        HPI:    43 yo male who presents to the ED with a cc of difficulty breathing and chest discomfort.  Pt reports that he has no known medical issues.  Reports that he has been feeling unwell since last Wednesday.  Pt reports that last Wednesday he developed subjective fevers, chills, fatigue, diffuse myalgias, and a non productive cough.  He reports that symptoms continued and appears to have worsened and then over the last 2-3 days he developed chest tightness most noted on inspiration with shortness or breath.  Denies N/V/D/C, abd pain, ext numbness or weakness.  Pt reports that he is not a smoker and has no underlying lung pathology.  Denies noting any sick contacts.  He has not been seen for these symptoms prior to today.        The date the pt first felt unwell:     Fever or chills: yes [x  ]   no [  ]   - Tmax:     Fatigue, malaise or generalized weakness: yes [ x ]   no [  ]    Shortness of breath/dyspnea: yes [ x ]   no [  ]    Cough: yes [ x ]   no [  ], sputum production: yes [  ]   no [x  ]    Anorexia/po intolerance: yes [  ]   no [ x ]    Chest pain or chest tightness: yes [ x ]   no [  ]    Myalgias: yes [  ]   no [ x ]    Headache: yes [  ]   no [ x ]    Sore throat: yes [  ]   no [x  ]    Rhinorrhea: yes [  ]   no [ x ]    Abd pain: yes [  ]   no [ x ]    Nausea: yes [  ]   no [ x ]    Vomiting: yes [  ]   no [ x ]    Diarrhea: yes [  ]   no [x  ]    Loss of sense of smell/anosmia: yes [  ]   no [ x ]    Conjunctivitis: yes [  ]   no [ x ]    Recent travel: yes [  ]   no [ x ] - Location:     Any sick contacts: yes [  ]   no [ x ]    Close contact with someone confirmed positive with COVID-19 / SARS-CoV2 in the last 14 days: yes [  ]   no [x  ]    Other associated complaints:     Code status: FULL CODE (01 Apr 2020 02:18)            ---    HOSPITAL COURSE: Patient admitted to the general medical floor for symptoms consistent with COVID-19. Supplemental oxygen given as required to maintain adequate saturation. NPPV, high flow O2, nebulizers or other interventions that would increase risk of aerosolization avoided. Prognostic lab value trended down during admission and patient's respiratory status was monitored closely.         Patient was medically optimized and improved clinically throughout hospital course. Patient seen and examined on day of discharge.        Vital Signs:        Physical Exam:            Patient is medically stable for discharge to home with outpatient follow up.    ---    CONSULTANTS:     Infectious Disease: Dr. Sanz    ---    FINAL DISCHARGE DIAGNOSIS LIST:    Please see last daily progress note for final discharge diagnoses ADMISSION H+P:        HPI:    43 yo male who presents to the ED with a cc of difficulty breathing and chest discomfort.  Pt reports that he has no known medical issues.  Reports that he has been feeling unwell since last Wednesday.  Pt reports that last Wednesday he developed subjective fevers, chills, fatigue, diffuse myalgias, and a non productive cough.  He reports that symptoms continued and appears to have worsened and then over the last 2-3 days he developed chest tightness most noted on inspiration with shortness or breath.  Denies N/V/D/C, abd pain, ext numbness or weakness.  Pt reports that he is not a smoker and has no underlying lung pathology.  Denies noting any sick contacts.  He has not been seen for these symptoms prior to today.            ---    HOSPITAL COURSE: Patient admitted to the general medical floor for symptoms consistent with COVID-19. Supplemental oxygen given as required to maintain adequate saturation. NPPV, high flow O2, nebulizers or other interventions that would increase risk of aerosolization avoided. Prognostic lab value trended down during admission and patient's respiratory status was monitored closely.         Patient was medically optimized and improved clinically throughout hospital course. Patient seen and examined on day of discharge.        Patient is medically stable for discharge to home with outpatient follow up.    ---    CONSULTANTS:     Infectious Disease: Dr. Sanz    ---    Total time spent on discharge including coordination of care by attending physician: 41 minutes

## 2020-04-06 NOTE — PROGRESS NOTE ADULT - ASSESSMENT
45 yo male who presents to the ED with a cc of difficulty breathing and chest discomfort. Admitted for r/o COVID.

## 2020-04-06 NOTE — DISCHARGE NOTE PROVIDER - CARE PROVIDER_API CALL
Rajinder Woodall (DO)  Family Medicine  [address] [place] [address], [address]  [place] [address]  Phone: [phone]  Fax: [phone]  Follow Up Time:

## 2020-04-06 NOTE — PROGRESS NOTE ADULT - PROBLEM SELECTOR PLAN 1
Acute hypoxic respiratory failure 2/2 PNA in setting of suspected COVID19 viral illness given clinical presentation, CXR with bilateral GGO  - Day 11 of symptoms  - continue O2 NC, may use NRB  -  AVOID HFNC/Bipap/Nebs  - Contact and droplet isolation precautions  - Check CRP, D-dimer, ferritin, procalc  - Tylenol prn for fever  - may use albuterol/ipratroprium inhaler  - Start supplemental oxygen therapy immediately if pt has respiratory distress target SpO2 > 94%  - NEWS2 score 4 (if >= 7 high risk, consider ICU consult)  - monitor respiratory status closely  - patient is at a low risk of decompensation at this time  - Code Status: FULL CODE  -daily CBC w diff to follow eos, lymphocytes and neutrophils and daily NLR calculation (NLR <3 low vs >5 high) -other labs that may be selectively used to risk stratify and predict clinical course,   Procalcitonin (<0.2 associated with more severe disease),  Ferritin (lower risk <450 vs >850) CRP (low risk <2 and higher risk >6) D-dimer (<1,000 vs >1,000) LDH, ESR, PT/PTT, CK, lactate, troponin, IL-6  -antibiotics only if there is concern for a bacterial process  -COVID positive  -NLR 2.01 today, decreased from ventimask to NC- tolerated well Acute hypoxic respiratory failure 2/2 PNA in setting of suspected COVID19 viral illness given clinical presentation, CXR with bilateral GGO  - Day 12 of symptoms  - continue O2 NC, may use NRB  -  AVOID HFNC/Bipap/Nebs  - Contact and droplet isolation precautions  - Check CRP, D-dimer, ferritin, procalc  - Tylenol prn for fever  - may use albuterol/ipratroprium inhaler  - Start supplemental oxygen therapy immediately if pt has respiratory distress target SpO2 > 94%  - NEWS2 score 4 (if >= 7 high risk, consider ICU consult)  - monitor respiratory status closely  - patient is at a low risk of decompensation at this time  - Code Status: FULL CODE  -daily CBC w diff to follow eos, lymphocytes and neutrophils and daily NLR calculation (NLR <3 low vs >5 high) -other labs that may be selectively used to risk stratify and predict clinical course,   Procalcitonin (<0.2 associated with more severe disease),  Ferritin (lower risk <450 vs >850) CRP (low risk <2 and higher risk >6) D-dimer (<1,000 vs >1,000) LDH, ESR, PT/PTT, CK, lactate, troponin, IL-6  -antibiotics only if there is concern for a bacterial process  -COVID positive  -NLR 1.69 today, decreased O2 from 5L to 3.5L O2

## 2020-04-07 VITALS
RESPIRATION RATE: 20 BRPM | SYSTOLIC BLOOD PRESSURE: 102 MMHG | DIASTOLIC BLOOD PRESSURE: 67 MMHG | TEMPERATURE: 98 F | HEART RATE: 78 BPM | OXYGEN SATURATION: 96 %

## 2020-04-07 LAB
ALBUMIN SERPL ELPH-MCNC: 2.8 G/DL — LOW (ref 3.3–5)
ALP SERPL-CCNC: 122 U/L — HIGH (ref 40–120)
ALT FLD-CCNC: 126 U/L — HIGH (ref 12–78)
ANION GAP SERPL CALC-SCNC: 8 MMOL/L — SIGNIFICANT CHANGE UP (ref 5–17)
AST SERPL-CCNC: 81 U/L — HIGH (ref 15–37)
BASOPHILS # BLD AUTO: 0.03 K/UL — SIGNIFICANT CHANGE UP (ref 0–0.2)
BASOPHILS NFR BLD AUTO: 0.6 % — SIGNIFICANT CHANGE UP (ref 0–2)
BILIRUB SERPL-MCNC: 0.6 MG/DL — SIGNIFICANT CHANGE UP (ref 0.2–1.2)
BUN SERPL-MCNC: 12 MG/DL — SIGNIFICANT CHANGE UP (ref 7–23)
CALCIUM SERPL-MCNC: 9 MG/DL — SIGNIFICANT CHANGE UP (ref 8.5–10.1)
CHLORIDE SERPL-SCNC: 106 MMOL/L — SIGNIFICANT CHANGE UP (ref 96–108)
CO2 SERPL-SCNC: 24 MMOL/L — SIGNIFICANT CHANGE UP (ref 22–31)
CREAT SERPL-MCNC: 0.77 MG/DL — SIGNIFICANT CHANGE UP (ref 0.5–1.3)
CRP SERPL-MCNC: 2.95 MG/DL — HIGH (ref 0–0.4)
D DIMER BLD IA.RAPID-MCNC: 630 NG/ML DDU — HIGH
EOSINOPHIL # BLD AUTO: 0.32 K/UL — SIGNIFICANT CHANGE UP (ref 0–0.5)
EOSINOPHIL NFR BLD AUTO: 6.8 % — HIGH (ref 0–6)
FERRITIN SERPL-MCNC: 882 NG/ML — HIGH (ref 30–400)
GLUCOSE SERPL-MCNC: 126 MG/DL — HIGH (ref 70–99)
HCT VFR BLD CALC: 42.7 % — SIGNIFICANT CHANGE UP (ref 39–50)
HGB BLD-MCNC: 14.5 G/DL — SIGNIFICANT CHANGE UP (ref 13–17)
IMM GRANULOCYTES NFR BLD AUTO: 1.1 % — SIGNIFICANT CHANGE UP (ref 0–1.5)
LDH SERPL L TO P-CCNC: 522 U/L — HIGH (ref 50–242)
LYMPHOCYTES # BLD AUTO: 1.47 K/UL — SIGNIFICANT CHANGE UP (ref 1–3.3)
LYMPHOCYTES # BLD AUTO: 31.3 % — SIGNIFICANT CHANGE UP (ref 13–44)
MCHC RBC-ENTMCNC: 29 PG — SIGNIFICANT CHANGE UP (ref 27–34)
MCHC RBC-ENTMCNC: 34 GM/DL — SIGNIFICANT CHANGE UP (ref 32–36)
MCV RBC AUTO: 85.4 FL — SIGNIFICANT CHANGE UP (ref 80–100)
MONOCYTES # BLD AUTO: 0.35 K/UL — SIGNIFICANT CHANGE UP (ref 0–0.9)
MONOCYTES NFR BLD AUTO: 7.5 % — SIGNIFICANT CHANGE UP (ref 2–14)
NEUTROPHILS # BLD AUTO: 2.47 K/UL — SIGNIFICANT CHANGE UP (ref 1.8–7.4)
NEUTROPHILS NFR BLD AUTO: 52.7 % — SIGNIFICANT CHANGE UP (ref 43–77)
NRBC # BLD: 0 /100 WBCS — SIGNIFICANT CHANGE UP (ref 0–0)
PLATELET # BLD AUTO: 383 K/UL — SIGNIFICANT CHANGE UP (ref 150–400)
POTASSIUM SERPL-MCNC: 3.7 MMOL/L — SIGNIFICANT CHANGE UP (ref 3.5–5.3)
POTASSIUM SERPL-SCNC: 3.7 MMOL/L — SIGNIFICANT CHANGE UP (ref 3.5–5.3)
PROCALCITONIN SERPL-MCNC: <0.05 — SIGNIFICANT CHANGE UP (ref 0–0.04)
PROT SERPL-MCNC: 8.3 G/DL — SIGNIFICANT CHANGE UP (ref 6–8.3)
RBC # BLD: 5 M/UL — SIGNIFICANT CHANGE UP (ref 4.2–5.8)
RBC # FLD: 12.6 % — SIGNIFICANT CHANGE UP (ref 10.3–14.5)
SODIUM SERPL-SCNC: 138 MMOL/L — SIGNIFICANT CHANGE UP (ref 135–145)
WBC # BLD: 4.69 K/UL — SIGNIFICANT CHANGE UP (ref 3.8–10.5)
WBC # FLD AUTO: 4.69 K/UL — SIGNIFICANT CHANGE UP (ref 3.8–10.5)

## 2020-04-07 PROCEDURE — 84484 ASSAY OF TROPONIN QUANT: CPT

## 2020-04-07 PROCEDURE — 86140 C-REACTIVE PROTEIN: CPT

## 2020-04-07 PROCEDURE — 71045 X-RAY EXAM CHEST 1 VIEW: CPT

## 2020-04-07 PROCEDURE — 85027 COMPLETE CBC AUTOMATED: CPT

## 2020-04-07 PROCEDURE — 85379 FIBRIN DEGRADATION QUANT: CPT

## 2020-04-07 PROCEDURE — 83615 LACTATE (LD) (LDH) ENZYME: CPT

## 2020-04-07 PROCEDURE — 82550 ASSAY OF CK (CPK): CPT

## 2020-04-07 PROCEDURE — 84145 PROCALCITONIN (PCT): CPT

## 2020-04-07 PROCEDURE — 36415 COLL VENOUS BLD VENIPUNCTURE: CPT

## 2020-04-07 PROCEDURE — 93005 ELECTROCARDIOGRAM TRACING: CPT

## 2020-04-07 PROCEDURE — 99285 EMERGENCY DEPT VISIT HI MDM: CPT

## 2020-04-07 PROCEDURE — 82728 ASSAY OF FERRITIN: CPT

## 2020-04-07 PROCEDURE — 99053 MED SERV 10PM-8AM 24 HR FAC: CPT

## 2020-04-07 PROCEDURE — 82962 GLUCOSE BLOOD TEST: CPT

## 2020-04-07 PROCEDURE — 80053 COMPREHEN METABOLIC PANEL: CPT

## 2020-04-07 PROCEDURE — 94640 AIRWAY INHALATION TREATMENT: CPT

## 2020-04-07 PROCEDURE — 83690 ASSAY OF LIPASE: CPT

## 2020-04-07 PROCEDURE — 99239 HOSP IP/OBS DSCHRG MGMT >30: CPT

## 2020-04-07 PROCEDURE — 85652 RBC SED RATE AUTOMATED: CPT

## 2020-04-07 PROCEDURE — 82553 CREATINE MB FRACTION: CPT

## 2020-04-07 PROCEDURE — 87635 SARS-COV-2 COVID-19 AMP PRB: CPT

## 2020-04-07 RX ORDER — ALBUTEROL 90 UG/1
2 AEROSOL, METERED ORAL
Qty: 1 | Refills: 0
Start: 2020-04-07 | End: 2020-05-06

## 2020-04-07 RX ORDER — ACETAMINOPHEN 500 MG
2 TABLET ORAL
Qty: 0 | Refills: 0 | DISCHARGE
Start: 2020-04-07

## 2020-04-07 RX ADMIN — ENOXAPARIN SODIUM 40 MILLIGRAM(S): 100 INJECTION SUBCUTANEOUS at 11:15

## 2020-04-07 NOTE — PROGRESS NOTE ADULT - PROBLEM SELECTOR PLAN 1
Acute hypoxic respiratory failure 2/2 PNA in setting of suspected COVID19 viral illness given clinical presentation, CXR with bilateral GGO  - Day 12 of symptoms  - continue O2 NC, may use NRB  -  AVOID HFNC/Bipap/Nebs  - Contact and droplet isolation precautions  - Check CRP, D-dimer, ferritin, procalc  - Tylenol prn for fever  - may use albuterol/ipratroprium inhaler  - Start supplemental oxygen therapy immediately if pt has respiratory distress target SpO2 > 94%  - NEWS2 score 4 (if >= 7 high risk, consider ICU consult)  - monitor respiratory status closely  - patient is at a low risk of decompensation at this time  - Code Status: FULL CODE  -daily CBC w diff to follow eos, lymphocytes and neutrophils and daily NLR calculation (NLR <3 low vs >5 high) -other labs that may be selectively used to risk stratify and predict clinical course,   Procalcitonin (<0.2 associated with more severe disease),  Ferritin (lower risk <450 vs >850) CRP (low risk <2 and higher risk >6) D-dimer (<1,000 vs >1,000) LDH, ESR, PT/PTT, CK, lactate, troponin, IL-6  -antibiotics only if there is concern for a bacterial process  -COVID positive  -NLR 1.69 today, decreased O2 from 5L to 3.5L O2 Acute hypoxic respiratory failure 2/2 PNA in setting of suspected COVID19 viral illness given clinical presentation, CXR with bilateral GGO  - Day 13 of symptoms  - continue O2 NC, may use NRB  -  AVOID HFNC/Bipap/Nebs  - Contact and droplet isolation precautions  - Check CRP, D-dimer, ferritin, procalc  - Tylenol prn for fever  - may use albuterol/ipratroprium inhaler  - Start supplemental oxygen therapy immediately if pt has respiratory distress target SpO2 > 94%  - NEWS2 score 4 (if >= 7 high risk, consider ICU consult)  - monitor respiratory status closely  - patient is at a low risk of decompensation at this time  - Code Status: FULL CODE  -daily CBC w diff to follow eos, lymphocytes and neutrophils and daily NLR calculation (NLR <3 low vs >5 high) -other labs that may be selectively used to risk stratify and predict clinical course,   Procalcitonin (<0.2 associated with more severe disease),  Ferritin (lower risk <450 vs >850) CRP (low risk <2 and higher risk >6) D-dimer (<1,000 vs >1,000) LDH, ESR, PT/PTT, CK, lactate, troponin, IL-6  -antibiotics only if there is concern for a bacterial process  -COVID positive  -NLR 1.69 today, decreased O2 from 5L to 3.5L O2 Acute hypoxic respiratory failure 2/2 PNA in setting of suspected COVID19 viral illness given clinical presentation, CXR with bilateral GGO  - Day 13 of symptoms  - continue O2 NC, may use NRB  -  AVOID HFNC/Bipap/Nebs  - Contact and droplet isolation precautions  - Check CRP, D-dimer, ferritin, procalc  - Tylenol prn for fever  - may use albuterol/ipratroprium inhaler  - Start supplemental oxygen therapy immediately if pt has respiratory distress target SpO2 > 94%  - NEWS2 score 4 (if >= 7 high risk, consider ICU consult)  - monitor respiratory status closely  - patient is at a low risk of decompensation at this time  - Code Status: FULL CODE  -daily CBC w diff to follow eos, lymphocytes and neutrophils and daily NLR calculation (NLR <3 low vs >5 high) -other labs that may be selectively used to risk stratify and predict clinical course,   Procalcitonin (<0.2 associated with more severe disease),  Ferritin (lower risk <450 vs >850) CRP (low risk <2 and higher risk >6) D-dimer (<1,000 vs >1,000) LDH, ESR, PT/PTT, CK, lactate, troponin, IL-6  -antibiotics only if there is concern for a bacterial process  -COVID positive  -NLR 1.57 today, low risk for decompensation

## 2020-04-07 NOTE — PROGRESS NOTE ADULT - REASON FOR ADMISSION
r/o COVID, symptom onset 3/25/2020, FULL CODE

## 2020-04-07 NOTE — PROGRESS NOTE ADULT - SUBJECTIVE AND OBJECTIVE BOX
HPI:  45 yo male who presents to the ED with a cc of difficulty breathing and chest discomfort.  Pt reports that he has no known medical issues.  Reports that he has been feeling unwell since last Wednesday.  Pt reports that last Wednesday he developed subjective fevers, chills, fatigue, diffuse myalgias, and a non productive cough.  He reports that symptoms continued and appears to have worsened and then over the last 2-3 days he developed chest tightness most noted on inspiration with shortness or breath.  Denies N/V/D/C, abd pain, ext numbness or weakness.  Pt reports that he is not a smoker and has no underlying lung pathology.  Denies noting any sick contacts.  He has not been seen for these symptoms prior to today.    INTERVAL HISTORY/OVERNIGHT EVENTS:    REVIEW OF SYSTEMS:  CONSTITUTIONAL: No weakness, fevers or chills  EYES/ENT: No visual changes, no throat pain   RESPIRATORY: No cough, wheezing, hemoptysis; No shortness of breath  CARDIOVASCULAR: No chest pain or palpitations  GASTROINTESTINAL: No abdominal, nausea, vomiting, or hematemesis; No diarrhea or constipation. No melena or hematochezia.  GENITOURINARY: No dysuria, frequency or hematuria  NEUROLOGICAL: No dizziness, numbness, or weakness  SKIN: No itching, burning, rashes, or lesions   All other review of systems is negative unless indicated above.    VITAL SIGNS:  Vital Signs Last 24 Hrs  T(C): 37.2 (07 Apr 2020 05:36), Max: 37.2 (06 Apr 2020 19:34)  T(F): 99 (07 Apr 2020 05:36), Max: 99 (06 Apr 2020 19:34)  HR: 63 (07 Apr 2020 05:36) (63 - 79)  BP: 113/69 (07 Apr 2020 05:36) (107/64 - 113/69)  BP(mean): --  RR: 18 (07 Apr 2020 05:36) (18 - 26)  SpO2: 95% (07 Apr 2020 07:40) (85% - 96%)      PHYSICAL EXAM:   GENERAL: no acute distress  HEENT: NC/AT, EOMI, neck supple, MMM  RESPIRATORY: LCTAB/L, no rhonchi, rales, or wheezing  CARDIOVASCULAR: RRR, no murmurs, gallops, rubs  ABDOMINAL: soft, non-tender, non-distended, positive bowel sounds   EXTREMITIES: no clubbing, cyanosis, or edema  NEUROLOGICAL: alert and oriented x 3, non-focal  SKIN: no rashes or lesions   MUSCULOSKELETAL: no gross joint deformity                          13.9   5.70  )-----------( 334      ( 06 Apr 2020 10:10 )             41.3     04-06    138  |  104  |  13  ----------------------------<  92  3.6   |  25  |  0.71    Ca    8.9      06 Apr 2020 10:10    TPro  8.1  /  Alb  2.8<L>  /  TBili  0.8  /  DBili  x   /  AST  117<H>  /  ALT  136<H>  /  AlkPhos  123<H>  04-06      CAPILLARY BLOOD GLUCOSE          MEDICATIONS  (STANDING):  enoxaparin Injectable 40 milliGRAM(s) SubCutaneous daily HPI:  45 yo male who presents to the ED with a cc of difficulty breathing and chest discomfort.  Pt reports that he has no known medical issues.  Reports that he has been feeling unwell since last Wednesday.  Pt reports that last Wednesday he developed subjective fevers, chills, fatigue, diffuse myalgias, and a non productive cough.  He reports that symptoms continued and appears to have worsened and then over the last 2-3 days he developed chest tightness most noted on inspiration with shortness or breath.  Denies N/V/D/C, abd pain, ext numbness or weakness.  Pt reports that he is not a smoker and has no underlying lung pathology.  Denies noting any sick contacts.  He has not been seen for these symptoms prior to today.    INTERVAL HISTORY/OVERNIGHT EVENTS: Patient seen and examined at bedside. No acute overnight events.     REVIEW OF SYSTEMS:  CONSTITUTIONAL: No weakness, fevers or chills  EYES/ENT: No visual changes, no throat pain   RESPIRATORY: No cough, wheezing, hemoptysis; No shortness of breath  CARDIOVASCULAR: No chest pain or palpitations  GASTROINTESTINAL: No abdominal, nausea, vomiting, or hematemesis; No diarrhea or constipation. No melena or hematochezia.  GENITOURINARY: No dysuria, frequency or hematuria  NEUROLOGICAL: No dizziness, numbness, or weakness  SKIN: No itching, burning, rashes, or lesions   All other review of systems is negative unless indicated above.    VITAL SIGNS:  Vital Signs Last 24 Hrs  T(C): 37.2 (07 Apr 2020 05:36), Max: 37.2 (06 Apr 2020 19:34)  T(F): 99 (07 Apr 2020 05:36), Max: 99 (06 Apr 2020 19:34)  HR: 63 (07 Apr 2020 05:36) (63 - 79)  BP: 113/69 (07 Apr 2020 05:36) (107/64 - 113/69)  BP(mean): --  RR: 18 (07 Apr 2020 05:36) (18 - 26)  SpO2: 95% (07 Apr 2020 07:40) (85% - 96%)      PHYSICAL EXAM:   GENERAL: no acute distress  HEENT: NC/AT, EOMI, neck supple, MMM  RESPIRATORY: LCTAB/L, no rhonchi, rales, or wheezing  CARDIOVASCULAR: RRR, no murmurs, gallops, rubs  ABDOMINAL: soft, non-tender, non-distended, positive bowel sounds   EXTREMITIES: no clubbing, cyanosis, or edema  NEUROLOGICAL: alert and oriented x 3, non-focal  SKIN: no rashes or lesions   MUSCULOSKELETAL: no gross joint deformity                          13.9   5.70  )-----------( 334      ( 06 Apr 2020 10:10 )             41.3     04-06    138  |  104  |  13  ----------------------------<  92  3.6   |  25  |  0.71    Ca    8.9      06 Apr 2020 10:10    TPro  8.1  /  Alb  2.8<L>  /  TBili  0.8  /  DBili  x   /  AST  117<H>  /  ALT  136<H>  /  AlkPhos  123<H>  04-06      CAPILLARY BLOOD GLUCOSE          MEDICATIONS  (STANDING):  enoxaparin Injectable 40 milliGRAM(s) SubCutaneous daily HPI:  45 yo male who presents to the ED with a cc of difficulty breathing and chest discomfort.  Pt reports that he has no known medical issues.  Reports that he has been feeling unwell since last Wednesday.  Pt reports that last Wednesday he developed subjective fevers, chills, fatigue, diffuse myalgias, and a non productive cough.  He reports that symptoms continued and appears to have worsened and then over the last 2-3 days he developed chest tightness most noted on inspiration with shortness or breath.  Denies N/V/D/C, abd pain, ext numbness or weakness.  Pt reports that he is not a smoker and has no underlying lung pathology.  Denies noting any sick contacts.  He has not been seen for these symptoms prior to today.    INTERVAL HISTORY/OVERNIGHT EVENTS: Patient seen and examined at bedside. No acute overnight events. Eager to go home.    REVIEW OF SYSTEMS:  CONSTITUTIONAL: No weakness, fevers or chills  EYES/ENT: No visual changes, no throat pain   RESPIRATORY: No cough, wheezing, hemoptysis; No shortness of breath  CARDIOVASCULAR: No chest pain or palpitations  GASTROINTESTINAL: No abdominal, nausea, vomiting, or hematemesis; No diarrhea or constipation. No melena or hematochezia.  GENITOURINARY: No dysuria, frequency or hematuria  NEUROLOGICAL: No dizziness, numbness, or weakness  SKIN: No itching, burning, rashes, or lesions   All other review of systems is negative unless indicated above.    VITAL SIGNS:  Vital Signs Last 24 Hrs  T(C): 37.2 (07 Apr 2020 05:36), Max: 37.2 (06 Apr 2020 19:34)  T(F): 99 (07 Apr 2020 05:36), Max: 99 (06 Apr 2020 19:34)  HR: 63 (07 Apr 2020 05:36) (63 - 79)  BP: 113/69 (07 Apr 2020 05:36) (107/64 - 113/69)  BP(mean): --  RR: 18 (07 Apr 2020 05:36) (18 - 26)  SpO2: 95% (07 Apr 2020 07:40) (85% - 96%)      PHYSICAL EXAM:   GENERAL: no acute distress  HEENT: NC/AT, EOMI, neck supple, MMM  RESPIRATORY: +R sided crackles  CARDIOVASCULAR: RRR, no murmurs, gallops, rubs  ABDOMINAL: soft, non-tender, non-distended, positive bowel sounds   EXTREMITIES: no clubbing, cyanosis, or edema  NEUROLOGICAL: alert and oriented x 3, non-focal  SKIN: no rashes or lesions   MUSCULOSKELETAL: no gross joint deformity                          13.9   5.70  )-----------( 334      ( 06 Apr 2020 10:10 )             41.3     04-06    138  |  104  |  13  ----------------------------<  92  3.6   |  25  |  0.71    Ca    8.9      06 Apr 2020 10:10    TPro  8.1  /  Alb  2.8<L>  /  TBili  0.8  /  DBili  x   /  AST  117<H>  /  ALT  136<H>  /  AlkPhos  123<H>  04-06      CAPILLARY BLOOD GLUCOSE          MEDICATIONS  (STANDING):  enoxaparin Injectable 40 milliGRAM(s) SubCutaneous daily

## 2020-04-07 NOTE — DISCHARGE NOTE NURSING/CASE MANAGEMENT/SOCIAL WORK - PATIENT PORTAL LINK FT
You can access the FollowMyHealth Patient Portal offered by [facility] by registering at the following website: [email or web address]. By joining [employer]’s FollowMyHealth portal, you will also be able to view your health information using other applications (apps) compatible with our system.

## 2021-08-09 NOTE — H&P ADULT - PROBLEM/PLAN-2
----- Message from Isidro Zafar MD sent at 8/9/2021  8:43 AM CDT -----  INR 1.9 - keep same dose and can recheck 1 month   DISPLAY PLAN FREE TEXT

## 2023-01-17 NOTE — PATIENT PROFILE ADULT - DISASTER - NSPRESCRALCFREQ_GEN_A_NUR
Dr. Blakely,  I saw Oliver in follow up for his right lower extremity hematoma. His pain and swelling have resolved and he can be restarted on antiplatelet/anticoagulation. Will follow up as needed.  Thanks, Shashi Never

## 2024-09-12 NOTE — H&P ADULT - ASSESSMENT
Called and left voicemail for patient to return call and schedule follow-up appointment.    43 yo male who presents to the ED with a cc of difficulty breathing and chest discomfort. Admitted for r/o COVID.